# Patient Record
Sex: FEMALE | Employment: OTHER | URBAN - METROPOLITAN AREA
[De-identification: names, ages, dates, MRNs, and addresses within clinical notes are randomized per-mention and may not be internally consistent; named-entity substitution may affect disease eponyms.]

---

## 2017-01-03 RX ORDER — PREGABALIN 50 MG/1
50 CAPSULE ORAL 2 TIMES DAILY
Qty: 180 CAP | Refills: 1 | Status: SHIPPED | OUTPATIENT
Start: 2017-01-03 | End: 2017-07-13 | Stop reason: SDUPTHER

## 2017-01-03 NOTE — TELEPHONE ENCOUNTER
Spoke with patient regarding refill request. Patient stated that she was supposed to have her Lyrica refilled last month but there was an issue and would have cost her $400. Patient stated she asked \"them to hold onto it\" until the new year due to the cost. Patient verified that she needs a refill.

## 2017-01-04 NOTE — TELEPHONE ENCOUNTER
Insurance calling to check and see if the medication, Lyrica has been sent to pt's mail order pharmacy. Call with any questions.  [facility] [employer]: [phone] Fax: [phone]

## 2017-06-01 ENCOUNTER — OFFICE VISIT (OUTPATIENT)
Dept: NEUROLOGY | Age: 82
End: 2017-06-01

## 2017-06-01 VITALS
WEIGHT: 156 LBS | DIASTOLIC BLOOD PRESSURE: 62 MMHG | HEIGHT: 60 IN | OXYGEN SATURATION: 94 % | HEART RATE: 73 BPM | BODY MASS INDEX: 30.63 KG/M2 | SYSTOLIC BLOOD PRESSURE: 116 MMHG | RESPIRATION RATE: 18 BRPM

## 2017-06-01 DIAGNOSIS — G62.9 POLYNEUROPATHY: Primary | ICD-10-CM

## 2017-06-01 NOTE — MR AVS SNAPSHOT
Visit Information Date & Time Provider Department Dept. Phone Encounter #  
 6/1/2017 10:40 AM Wang Corral MD [facility] at [address] [id number] Follow-up Instructions Return in about 6 months (around 12/1/2017). Upcoming Health Maintenance Date Due DTaP/Tdap/Td series (1 - Tdap) 6/10/1955 ZOSTER VACCINE AGE 60> 6/10/1994 GLAUCOMA SCREENING Q2Y 6/10/1999 OSTEOPOROSIS SCREENING (DEXA) 6/10/1999 Pneumococcal 65+ Low/Medium Risk (1 of 2 - PCV13) 6/10/1999 MEDICARE YEARLY EXAM 6/10/1999 INFLUENZA AGE 9 TO ADULT 8/1/2017 Allergies as of 6/1/2017  Review Complete On: 6/1/2017 By: Alfredo Bermudez LPN Severity Noted Reaction Type Reactions Sulfa (Sulfonamide Antibiotics)  12/01/2016    Rash Current Immunizations  Never Reviewed No immunizations on file. Not reviewed this visit You Were Diagnosed With   
  
 Codes Comments Polyneuropathy    -  Primary ICD-10-CM: G62.9 ICD-9-CM: 356.9 Vitals BP Pulse Resp Height(growth percentile) Weight(growth percentile) SpO2  
 116/62 73 18 5' (1.524 m) 156 lb (70.8 kg) 94% BMI Smoking Status 30.47 kg/m2 Never Smoker Vitals History BMI and BSA Data Body Mass Index Body Surface Area  
 30.47 kg/m 2 1.73 m 2 Your Updated Medication List  
  
   
This list is accurate as of: 6/1/17 11:07 AM.  Always use your most recent med list. amLODIPine 5 mg tablet Commonly known as:  Amina Million TAKE 1 TABLET BY MOUTH EVERY DAY  
  
 CALCIUM 600 WITH VITAMIN D3 PO Take  by mouth.  
  
 citalopram 20 mg tablet Commonly known as:  CELEXA  
TAKE ONE TABLET BY MOUTH DAILY IN THE MORNING  
  
 divalproex  mg tablet Commonly known as:  DEPAKOTE Take 125 mg by mouth daily. FIBER PO Take  by mouth. finasteride 5 mg tablet Commonly known as:  PROSCAR  
TAKE 1 TABLET BY MOUTH EVERY DAY  
  
 FISH OIL 1,000 mg Cap Generic drug:  omega-3 fatty acids-vitamin e Take 1 Cap by mouth.  
  
 levothyroxine 125 mcg tablet Commonly known as:  SYNTHROID  
TAKE 1 TABLET BY MOUTH EVERY DAY  
  
 MSM PO Take 1,000 mg by mouth daily. multivitamin tablet Commonly known as:  ONE A DAY Take 1 Tab by mouth daily. pravastatin 40 mg tablet Commonly known as:  PRAVACHOL  
TAKE 1 TABLET BY MOUTH EVERY DAY  
  
 pregabalin 50 mg capsule Commonly known as:  Lujan Dye Take 1 Cap by mouth two (2) times a day. Max Daily Amount: 100 mg.  
  
 valsartan-hydroCHLOROthiazide 320-25 mg per tablet Commonly known as:  DIOVAN-HCT  
TAKE 1 TABLET BY MOUTH EVERY DAY  
  
 VITAMIN C 500 mg tablet Generic drug:  ascorbic acid (vitamin C) Take  by mouth. ZyrTEC 10 mg tablet Generic drug:  cetirizine Take  by mouth daily. Follow-up Instructions Return in about 6 months (around 12/1/2017). Patient Instructions PRESCRIPTION REFILL POLICY [facility] Statement to Patients April 1, 2014 In an effort to ensure the large volume of patient prescription refills is processed in the most efficient and expeditious manner, we are asking our patients to assist us by calling your Pharmacy for all prescription refills, this will include also your  Mail Order Pharmacy. The pharmacy will contact our office electronically to continue the refill process. Please do not wait until the last minute to call your pharmacy. We need at least 48 hours (2days) to fill prescriptions. We also encourage you to call your pharmacy before going to  your prescription to make sure it is ready. With regard to controlled substance prescription refill requests (narcotic refills) that need to be picked up at our office, we ask your cooperation by providing us with at least 72 hours (3days) notice that you will need a refill. We will not refill narcotic prescription refill requests after 4:00pm on any weekday, Monday through Thursday, or after 2:00pm on Fridays, or on the weekends. We encourage everyone to explore another way of getting your prescription refill request processed using [employer], our patient web portal through our electronic medical record system. [employer] is an efficient and effective way to communicate your medication request directly to the office and  downloadable as an ro on your smart phone . [employer] also features a review functionality that allows you to view your medication list as well as leave messages for your physician. Are you ready to get connected? If so please review the attatched instructions or speak to any of our staff to get you set up right away! Thank you so much for your cooperation. Should you have any questions please contact our Practice Administrator. The Physicians and Staff,  [facility] Please bring all medication bottles, including vitamins, supplements and any over-the-counter medications, to your next office visit. Introducing [facility]! [facility] introduces [employer] patient portal. Now you can access parts of your medical record, email your doctor's office, and request medication refills online. 1. In your internet browser, go to [email or web address] 2. Click on the First Time User? Click Here link in the Sign In box. You will see the New Member Sign Up page. 3. Enter your [employer] Access Code exactly as it appears below. You will not need to use this code after youve completed the sign-up process. If you do not sign up before the expiration date, you must request a new code. · [employer] Access Code: [email or web address] Expires: 8/30/2017 10:44 AM 
 
4. Enter the last four digits of your Social Security Number (xxxx) and Date of Birth (mm/dd/yyyy) as indicated and click Submit.  You will be taken to the next sign-up page. 5. Create a MoveEZ ID. This will be your [employer] login ID and cannot be changed, so think of one that is secure and easy to remember. 6. Create a [employer] password. You can change your password at any time. 7. Enter your Password Reset Question and Answer. This can be used at a later time if you forget your password. 8. Enter your e-mail address. You will receive e-mail notification when new information is available in [address] 9. Click Sign Up. You can now view and download portions of your medical record. 10. Click the Download Summary menu link to download a portable copy of your medical information. If you have questions, please visit the Frequently Asked Questions section of the [employer] website. Remember, Move[employer] is NOT to be used for urgent needs. For medical emergencies, dial 911. Now available from your iPhone and Android! Please provide this summary of care documentation to your next provider. Your primary care clinician is listed as Ra Ochoa. If you have any questions after today's visit, please call [phone].

## 2017-06-01 NOTE — PATIENT INSTRUCTIONS
[facility]   Statement to Patients  April 1, 2014      In an effort to ensure the large volume of patient prescription refills is processed in the most efficient and expeditious manner, we are asking our patients to assist us by calling your Pharmacy for all prescription refills, this will include also your  Mail Order Pharmacy. The pharmacy will contact our office electronically to continue the refill process. Please do not wait until the last minute to call your pharmacy. We need at least 48 hours (2days) to fill prescriptions. We also encourage you to call your pharmacy before going to  your prescription to make sure it is ready. With regard to controlled substance prescription refill requests (narcotic refills) that need to be picked up at our office, we ask your cooperation by providing us with at least 72 hours (3days) notice that you will need a refill. We will not refill narcotic prescription refill requests after 4:00pm on any weekday, Monday through Thursday, or after 2:00pm on Fridays, or on the weekends. We encourage everyone to explore another way of getting your prescription refill request processed using [employer], our patient web portal through our electronic medical record system. [employer] is an efficient and effective way to communicate your medication request directly to the office and  downloadable as an ro on your smart phone . [employer] also features a review functionality that allows you to view your medication list as well as leave messages for your physician. Are you ready to get connected? If so please review the attatched instructions or speak to any of our staff to get you set up right away! Thank you so much for your cooperation. Should you have any questions please contact our Practice Administrator.     The Physicians and Staff,  [facility]           Please bring all medication bottles, including vitamins, supplements and any over-the-counter medications, to your next office visit.

## 2017-06-01 NOTE — PROGRESS NOTES
Patient here for follow up on neuropathy in her feet. She feels things have improved since last office visit.

## 2017-06-01 NOTE — PROGRESS NOTES
Neurology Progress Note    HISTORY PROVIDED BY: patient    Chief Complaint:   Chief Complaint   Patient presents with    Neuropathy     in feet      Subjective:   Pt is an 80 y.o. 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 NCS/EMG 2/4/2016 findings could be consistent with an early or mild large fiber neuropathy. Exam revealed prox to distal gradient to PP at ankles, new since previous visit, intact reflexes, no weakness. Mild or early large fiber neuropathy versus small fiber neuropathy, likely idiopathic or hereditary. Continued Lyrica 50mg bid. Discussed BPPV and recommend restarting home exercise. She returns for planned f/u. Still doing very well on Lyrica 50mg bid. No falls. Still still feels a little dizzy, intermittent, will walk to the right when this occurs, has been doing the BPPV exercises.      Past Medical History:   Diagnosis Date    BPPV (benign paroxysmal positional vertigo)     Concussion     after fall 12/14/13    Depression     Environmental allergies     Foot pain, bilateral     Hyperlipidemia     Hypertension     Hypothyroidism     Polyneuropathy       Past Surgical History:   Procedure Laterality Date    HX APPENDECTOMY      HX CATARACT REMOVAL      HX CHOLECYSTECTOMY      HX COLECTOMY      HX TOTAL ABDOMINAL HYSTERECTOMY        Social History     Social History    Marital status:      Spouse name: N/A    Number of children: N/A    Years of education: N/A     Occupational History    Retired      Social History Main Topics    Smoking status: Never Smoker    Smokeless tobacco: Not on file    Alcohol use Yes    Drug use: No    Sexual activity: Not on file     Other Topics Concern    Not on file Social History Narrative     Family History   Problem Relation Age of Onset    Parkinson's Disease Mother     Heart Disease Mother    Glenys Maciel Arthritis-rheumatoid Mother     Cancer Father     Suicide Daughter     No Known Problems Daughter           Objective:   ROS:  Per HPI-  Otherwise 10 point ROS was negative    Allergies   Allergen Reactions    Sulfa (Sulfonamide Antibiotics) Rash       Meds:    Current Outpatient Prescriptions:     PSYLLIUM SEED, WITH DEXTROSE, (FIBER PO), Take  by mouth., Disp: , Rfl:     pregabalin (LYRICA) 50 mg capsule, Take 1 Cap by mouth two (2) times a day. Max Daily Amount: 100 mg., Disp: 180 Cap, Rfl: 1    amLODIPine (NORVASC) 5 mg tablet, TAKE 1 TABLET BY MOUTH EVERY DAY, Disp: , Rfl: 2    citalopram (CELEXA) 20 mg tablet, TAKE ONE TABLET BY MOUTH DAILY IN THE MORNING, Disp: , Rfl: 8    finasteride (PROSCAR) 5 mg tablet, TAKE 1 TABLET BY MOUTH EVERY DAY, Disp: , Rfl: 4    valsartan-hydroCHLOROthiazide (DIOVAN-HCT) 320-25 mg per tablet, TAKE 1 TABLET BY MOUTH EVERY DAY, Disp: , Rfl: 3    pravastatin (PRAVACHOL) 40 mg tablet, TAKE 1 TABLET BY MOUTH EVERY DAY, Disp: , Rfl: 3    levothyroxine (SYNTHROID) 125 mcg tablet, TAKE 1 TABLET BY MOUTH EVERY DAY, Disp: , Rfl: 3    cetirizine (ZYRTEC) 10 mg tablet, Take  by mouth daily. , Disp: , Rfl:     ascorbic acid, vitamin C, (VITAMIN C) 500 mg tablet, Take  by mouth., Disp: , Rfl:     CALCIUM CARBONATE/VITAMIN D3 (CALCIUM 600 WITH VITAMIN D3 PO), Take  by mouth., Disp: , Rfl:     multivitamin (ONE A DAY) tablet, Take 1 Tab by mouth daily. , Disp: , Rfl:     omega-3 fatty acids-vitamin e (FISH OIL) 1,000 mg cap, Take 1 Cap by mouth., Disp: , Rfl:     METHYLSULFONYLMETHANE (MSM PO), Take 1,000 mg by mouth daily. , Disp: , Rfl:     divalproex DR (DEPAKOTE) 125 mg tablet, Take 125 mg by mouth daily. , Disp: , Rfl:     Imaging:  MRI Results (most recent):  No results found for this or any previous visit.    CT Results (most recent):  No results found for this or any previous visit. Reviewed records in [employer] and Jini tab today    Lab Review   No results found for this or any previous visit. Exam:  Visit Vitals    /62    Pulse 73    Resp 18    Ht 5' (1.524 m)    Wt 70.8 kg (156 lb)    SpO2 94%    BMI 30.47 kg/m2     General:  Alert, cooperative, no distress. Head:  Normocephalic, without obvious abnormality, atraumatic. Respiratory:  Heart:   Non labored breathing  Regular rate and rhythm, no murmurs   Neck:   2+ carotids, no bruits   Extremities: Warm, no cyanosis or edema. Pulses: 2+ radial pulses. Neurologic:  MS: Alert and oriented x 4, speech intact. Language intact. Attention and fund of knowledge appropriate. Recent and remote memory intact. Cranial Nerves:  II: visual fields    II: pupils    II: optic disc    III,VII: ptosis none   III,IV,VI: extraocular muscles  EOMI, no nystagmus or diplopia   V: facial light touch sensation     VII: facial muscle function   symmetric   VIII: hearing intact   IX: soft palate elevation     XI: trapezius strength     XI: sternocleidomastoid strength    XII: tongue       Motor: normal bulk and tone, no tremor              Strength: 5/5 throughout  Sensory: Normal to PP throughout today   Coordination:   Gait: normal gait, unable to tandem walk  Reflexes: 2+ symmetric           Assessment/Plan   Pt is an 80 y.o. 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 NCS/EMG 2/4/2016 findings could be consistent with an early or mild large fiber neuropathy. Exam without prox to distal gradient to PP today,  intact reflexes, no weakness, normal gait.   Possible mild or early large fiber neuropathy versus small fiber neuropathy, likely idiopathic or hereditary. Continue Lyrica 50mg bid. Offered referral to vestibular therapy for BPPV, but she declined, sxs are intermittent and not inhibiting activity. F/u in clinic in 6 months,instructed to call if needed. ICD-10-CM ICD-9-CM    1. Polyneuropathy G62.9 356.9        Signed:   Rocky Craven MD  6/1/2017

## 2017-07-13 RX ORDER — PREGABALIN 50 MG/1
50 CAPSULE ORAL 2 TIMES DAILY
Qty: 180 CAP | Refills: 1 | Status: SHIPPED | OUTPATIENT
Start: 2017-07-13 | End: 2017-11-02 | Stop reason: SDUPTHER

## 2017-10-31 ENCOUNTER — TELEPHONE (OUTPATIENT)
Dept: NEUROLOGY | Age: 82
End: 2017-10-31

## 2017-10-31 NOTE — TELEPHONE ENCOUNTER
Pt said refilling her Lyrica was going to be too expensive. She is wondering if there is another medication she can be prescribed. Please call back.  Pt will not be home until 1:00pm tomorrow (11/1)

## 2017-11-02 RX ORDER — PREGABALIN 50 MG/1
50 CAPSULE ORAL 2 TIMES DAILY
Qty: 42 CAP | Refills: 0 | Status: SHIPPED | COMMUNITY
Start: 2017-11-02 | End: 2017-11-21 | Stop reason: SDUPTHER

## 2017-11-02 NOTE — TELEPHONE ENCOUNTER
Paola - Please call pt: See if we can give her samples until our PA coordinator returns next week and can see what the issue is. She is taking Lyrcia 50mg bid.

## 2017-11-02 NOTE — TELEPHONE ENCOUNTER
Spoke with patient and informed her that Dr. Roel Cortse has ok'd samples of Lyrica 50 mg until PA coordinator returns to the office and can assist with this issue. Advised can pick samples up in the office. Patient was given an opportunity to ask questions, repeated information, and verbalized understanding.

## 2017-11-20 RX ORDER — PREGABALIN 50 MG/1
50 CAPSULE ORAL 2 TIMES DAILY
Qty: 42 CAP | Refills: 0 | OUTPATIENT
Start: 2017-11-20

## 2017-11-20 NOTE — TELEPHONE ENCOUNTER
Requested Prescriptions     Pending Prescriptions Disp Refills    pregabalin (LYRICA) 50 mg capsule 42 Cap 0     Sig: Take 1 Cap by mouth two (2) times a day. Max Daily Amount: 100 mg.

## 2017-11-21 RX ORDER — PREGABALIN 50 MG/1
50 CAPSULE ORAL 2 TIMES DAILY
Qty: 60 CAP | Refills: 0 | Status: SHIPPED | OUTPATIENT
Start: 2017-11-21 | End: 2018-01-02

## 2018-01-02 ENCOUNTER — OFFICE VISIT (OUTPATIENT)
Dept: NEUROLOGY | Age: 83
End: 2018-01-02

## 2018-01-02 VITALS
HEART RATE: 73 BPM | OXYGEN SATURATION: 94 % | BODY MASS INDEX: 31.8 KG/M2 | RESPIRATION RATE: 18 BRPM | WEIGHT: 162 LBS | HEIGHT: 60 IN | DIASTOLIC BLOOD PRESSURE: 64 MMHG | SYSTOLIC BLOOD PRESSURE: 114 MMHG

## 2018-01-02 DIAGNOSIS — G62.9 POLYNEUROPATHY: Primary | ICD-10-CM

## 2018-01-02 RX ORDER — GABAPENTIN 300 MG/1
300 CAPSULE ORAL 2 TIMES DAILY
Qty: 60 CAP | Refills: 5 | Status: SHIPPED | OUTPATIENT
Start: 2018-01-02 | End: 2018-01-29 | Stop reason: SDUPTHER

## 2018-01-02 RX ORDER — ROSUVASTATIN CALCIUM 10 MG/1
TABLET, COATED ORAL
Refills: 6 | COMMUNITY
Start: 2017-12-15 | End: 2019-05-01

## 2018-01-02 NOTE — PROGRESS NOTES
Neurology Progress Note    HISTORY PROVIDED BY: patient    Chief Complaint:   Chief Complaint   Patient presents with    Neuropathy     f/u      Subjective:   Pt is an 80 y.o. 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 NCS/EMG 2/4/2016 findings could be consistent with an early or mild large fiber neuropathy. Exam without prox to distal gradient to PP, intact reflexes, no weakness, normal gait. Possible mild or early large fiber neuropathy versus small fiber neuropathy, likely idiopathic or hereditary. Continued Lyrica 50mg bid. Offered referral to vestibular therapy for BPPV, but she declined, sxs are intermittent and not inhibiting activity. F/u in clinic in 6 months,instructed to call if needed. She returns for f/u. She reports leg pain is well controlled on Lyrica 50mg bid, but it is going to cost too much this year. She has been out of Lyrica for a few days and is noticing a difference. She tried gabapentin in past and had dizziness, but this was at the same time she was having BPPV.         Past Medical History:   Diagnosis Date    BPPV (benign paroxysmal positional vertigo)     Concussion     after fall 12/14/13    Depression     Environmental allergies     Foot pain, bilateral     Hyperlipidemia     Hypertension     Hypothyroidism     Polyneuropathy       Past Surgical History:   Procedure Laterality Date    HX APPENDECTOMY      HX CATARACT REMOVAL      HX CHOLECYSTECTOMY      HX COLECTOMY      HX TOTAL ABDOMINAL HYSTERECTOMY        Social History     Social History    Marital status:      Spouse name: N/A    Number of children: N/A    Years of education: N/A     Occupational History    Retired      Social History Main Topics    Smoking status: Never Smoker    Smokeless tobacco: Never Used    Alcohol use Yes    Drug use: No    Sexual activity: Not on file     Other Topics Concern    Not on file     Social History Narrative     Family History   Problem Relation Age of Onset    Parkinson's Disease Mother     Heart Disease Mother    [address] [facility] Arthritis-rheumatoid Mother     Cancer Father     Suicide Daughter     No Known Problems Daughter           Objective:   ROS:  Per HPI o/w neg    Allergies   Allergen Reactions    Sulfa (Sulfonamide Antibiotics) Rash       Meds:    Current Outpatient Prescriptions:     rosuvastatin (CRESTOR) 10 mg tablet, TAKE 1 TABLET BY MOUTH AT BEDTIME (REPLACES PRAVASTATIN), Disp: , Rfl: 6    pregabalin (LYRICA) 50 mg capsule, Take 1 Cap by mouth two (2) times a day. Max Daily Amount: 100 mg., Disp: 60 Cap, Rfl: 0    amLODIPine (NORVASC) 5 mg tablet, TAKE 1 TABLET BY MOUTH EVERY DAY, Disp: , Rfl: 2    citalopram (CELEXA) 20 mg tablet, TAKE ONE TABLET BY MOUTH DAILY IN THE MORNING, Disp: , Rfl: 8    finasteride (PROSCAR) 5 mg tablet, TAKE 1 TABLET BY MOUTH EVERY DAY, Disp: , Rfl: 4    valsartan-hydroCHLOROthiazide (DIOVAN-HCT) 320-25 mg per tablet, TAKE 1 TABLET BY MOUTH EVERY DAY, Disp: , Rfl: 3    levothyroxine (SYNTHROID) 125 mcg tablet, TAKE 1 TABLET BY MOUTH EVERY DAY, Disp: , Rfl: 3    cetirizine (ZYRTEC) 10 mg tablet, Take  by mouth daily. , Disp: , Rfl:     CALCIUM CARBONATE/VITAMIN D3 (CALCIUM 600 WITH VITAMIN D3 PO), Take  by mouth., Disp: , Rfl:     multivitamin (ONE A DAY) tablet, Take 1 Tab by mouth daily. , Disp: , Rfl:     omega-3 fatty acids-vitamin e (FISH OIL) 1,000 mg cap, Take 1 Cap by mouth., Disp: , Rfl:     PSYLLIUM SEED, WITH DEXTROSE, (FIBER PO), Take  by mouth., Disp: , Rfl:     pravastatin (PRAVACHOL) 40 mg tablet, TAKE 1 TABLET BY MOUTH EVERY DAY, Disp: , Rfl: 3    ascorbic acid, vitamin C, (VITAMIN C) 500 mg tablet, Take  by mouth., Disp: , Rfl:    divalproex DR (DEPAKOTE) 125 mg tablet, Take 125 mg by mouth daily. , Disp: , Rfl:     METHYLSULFONYLMETHANE (MSM PO), Take 1,000 mg by mouth daily. , Disp: , Rfl:     Imaging:  MRI Results (most recent):  No results found for this or any previous visit. CT Results (most recent):  No results found for this or any previous visit. Reviewed records in [employer] and [employer] tab today    Lab Review   No results found for this or any previous visit. Exam:  Visit Vitals    /64    Pulse 73    Resp 18    Ht 5' (1.524 m)    Wt 73.5 kg (162 lb)    SpO2 94%    BMI 31.64 kg/m2     General:  Alert, cooperative, no distress. Head:  Normocephalic, without obvious abnormality, atraumatic. Respiratory:  Heart:   Non labored breathing  Regular rate and rhythm, no murmurs   Neck:      Extremities: Warm, no cyanosis or edema. Pulses: 2+ radial pulses. Neurologic:  MS: Alert and oriented x 4, speech intact. Language intact. Attention and fund of knowledge appropriate. Recent and remote memory intact. Cranial Nerves:  II: visual fields    II: pupils    II: optic disc    III,VII: ptosis none   III,IV,VI: extraocular muscles  EOMI, no nystagmus or diplopia   V: facial light touch sensation     VII: facial muscle function   symmetric   VIII: hearing intact   IX: soft palate elevation     XI: trapezius strength     XI: sternocleidomastoid strength    XII: tongue       Motor: normal bulk and tone, no tremor              Strength: 5/5 throughout  Sensory:  Coordination: Intact FTN  Gait: normal gait, able to tandem walk  Reflexes: 2+ symmetric           Assessment/Plan   Pt is an 80 y.o. 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   NCS/EMG 2/4/2016 findings could be consistent with an early or mild large fiber neuropathy. Exam with intact reflexes, no weakness, normal gait. Possible mild or early large fiber neuropathy versus small fiber neuropathy, likely idiopathic or hereditary. Stop Lyrica. Start gabapentin 300mg bid. F/u in clinic in 6 months,instructed to call if needed. ICD-10-CM ICD-9-CM    1. Polyneuropathy G62.9 356.9        Signed:   Janina Galarza MD  1/2/2018

## 2018-01-02 NOTE — PATIENT INSTRUCTIONS
[facility]   Statement to Patients  April 1, 2014      In an effort to ensure the large volume of patient prescription refills is processed in the most efficient and expeditious manner, we are asking our patients to assist us by calling your Pharmacy for all prescription refills, this will include also your  Mail Order Pharmacy. The pharmacy will contact our office electronically to continue the refill process. Please do not wait until the last minute to call your pharmacy. We need at least 48 hours (2days) to fill prescriptions. We also encourage you to call your pharmacy before going to  your prescription to make sure it is ready. With regard to controlled substance prescription refill requests (narcotic refills) that need to be picked up at our office, we ask your cooperation by providing us with at least 72 hours (3days) notice that you will need a refill. We will not refill narcotic prescription refill requests after 4:00pm on any weekday, Monday through Thursday, or after 2:00pm on Fridays, or on the weekends. We encourage everyone to explore another way of getting your prescription refill request processed using [employer], our patient web portal through our electronic medical record system. [employer] is an efficient and effective way to communicate your medication request directly to the office and  downloadable as an ro on your smart phone . [employer] also features a review functionality that allows you to view your medication list as well as leave messages for your physician. Are you ready to get connected? If so please review the attatched instructions or speak to any of our staff to get you set up right away! Thank you so much for your cooperation. Should you have any questions please contact our Practice Administrator.     The Physicians and Staff,  [facility]           Please bring all medication bottles, including vitamins, supplements and any over-the-counter medications, to your next office visit.

## 2018-01-02 NOTE — PROGRESS NOTES
Patient here for follow up on neuropathy. She reported that Lyrica is getting too expensive although it is helping and would like to discuss different options of medications.

## 2018-01-02 NOTE — MR AVS SNAPSHOT
Visit Information Date & Time Provider Department Dept. Phone Encounter #  
 1/2/2018  1:40 PM Eliel Adan MD [facility] at [facility] [phone] Follow-up Instructions Return in about 6 months (around 7/2/2018). Upcoming Health Maintenance Date Due DTaP/Tdap/Td series (1 - Tdap) 6[birth year]10/1955 ZOSTER VACCINE AGE 60> 4/10/1994 GLAUCOMA SCREENING Q2Y 6/10/1999 OSTEOPOROSIS SCREENING (DEXA) 6/10/1999 Pneumococcal 65+ Low/Medium Risk (1 of 2 - PCV13) 6/10/1999 MEDICARE YEARLY EXAM 6/10/1999 Influenza Age 5 to Adult 8/1/2017 Allergies as of 1/2/2018  Review Complete On: 1/2/2018 By: Eliel Adan MD  
  
 Severity Noted Reaction Type Reactions Sulfa (Sulfonamide Antibiotics)  12/01/2016    Rash Current Immunizations  Never Reviewed No immunizations on file. Not reviewed this visit You Were Diagnosed With   
  
 Codes Comments Polyneuropathy    -  Primary ICD-10-CM: G62.9 ICD-9-CM: 356.9 Vitals BP Pulse Resp Height(growth percentile) Weight(growth percentile) SpO2  
 114/64 73 18 5' (1.524 m) 162 lb (73.5 kg) 94% BMI Smoking Status 31.64 kg/m2 Never Smoker Vitals History BMI and BSA Data Body Mass Index Body Surface Area  
 31.64 kg/m 2 1.76 m 2 Preferred Pharmacy Pharmacy Name Phone [id number], [address] [place] [address] [phone] Your Updated Medication List  
  
   
This list is accurate as of: 1/2/18  2:01 PM.  Always use your most recent med list. amLODIPine 5 mg tablet Commonly known as:  Xuan Hair TAKE 1 TABLET BY MOUTH EVERY DAY  
  
 CALCIUM 600 WITH VITAMIN D3 PO Take  by mouth.  
  
 citalopram 20 mg tablet Commonly known as:  CELEXA  
TAKE ONE TABLET BY MOUTH DAILY IN THE MORNING  
  
 divalproex  mg tablet Commonly known as:  DEPAKOTE  
 Take 125 mg by mouth daily. FIBER PO Take  by mouth. finasteride 5 mg tablet Commonly known as:  PROSCAR  
TAKE 1 TABLET BY MOUTH EVERY DAY  
  
 FISH OIL 1,000 mg Cap Generic drug:  omega-3 fatty acids-vitamin e Take 1 Cap by mouth.  
  
 gabapentin 300 mg capsule Commonly known as:  NEURONTIN Take 1 Cap by mouth two (2) times a day. levothyroxine 125 mcg tablet Commonly known as:  SYNTHROID  
TAKE 1 TABLET BY MOUTH EVERY DAY  
  
 MSM PO Take 1,000 mg by mouth daily. multivitamin tablet Commonly known as:  ONE A DAY Take 1 Tab by mouth daily. pravastatin 40 mg tablet Commonly known as:  PRAVACHOL  
TAKE 1 TABLET BY MOUTH EVERY DAY  
  
 rosuvastatin 10 mg tablet Commonly known as:  CRESTOR  
TAKE 1 TABLET BY MOUTH AT BEDTIME (REPLACES PRAVASTATIN)  
  
 valsartan-hydroCHLOROthiazide 320-25 mg per tablet Commonly known as:  DIOVAN-HCT  
TAKE 1 TABLET BY MOUTH EVERY DAY  
  
 VITAMIN C 500 mg tablet Generic drug:  ascorbic acid (vitamin C) Take  by mouth. ZyrTEC 10 mg tablet Generic drug:  cetirizine Take  by mouth daily. Prescriptions Sent to Pharmacy Refills  
 gabapentin (NEURONTIN) 300 mg capsule 5 Sig: Take 1 Cap by mouth two (2) times a day. Class: Normal  
 Pharmacy: [id number] [place] [address] #: [phone] Route: Oral  
  
Follow-up Instructions Return in about 6 months (around 7/2/2018). Patient Instructions PRESCRIPTION REFILL POLICY [facility] Statement to Patients April 1, 2014 In an effort to ensure the large volume of patient prescription refills is processed in the most efficient and expeditious manner, we are asking our patients to assist us by calling your Pharmacy for all prescription refills, this will include also your  Mail Order Pharmacy.  The pharmacy will contact our office electronically to continue the refill process. Please do not wait until the last minute to call your pharmacy. We need at least 48 hours (2days) to fill prescriptions. We also encourage you to call your pharmacy before going to  your prescription to make sure it is ready. With regard to controlled substance prescription refill requests (narcotic refills) that need to be picked up at our office, we ask your cooperation by providing us with at least 72 hours (3days) notice that you will need a refill. We will not refill narcotic prescription refill requests after 4:00pm on any weekday, Monday through Thursday, or after 2:00pm on Fridays, or on the weekends. We encourage everyone to explore another way of getting your prescription refill request processed using [email or web address], our patient web portal through our electronic medical record system. [email or web address] is an efficient and effective way to communicate your medication request directly to the office and  downloadable as an ro on your smart phone . [email or web address] also features a review functionality that allows you to view your medication list as well as leave messages for your physician. Are you ready to get connected? If so please review the attatched instructions or speak to any of our staff to get you set up right away! Thank you so much for your cooperation. Should you have any questions please contact our Practice Administrator. The Physicians and Staff,  [facility] Please bring all medication bottles, including vitamins, supplements and any over-the-counter medications, to your next office visit. Introducing [facility]! [facility] introduces [email or web address] patient portal. Now you can access parts of your medical record, email your doctor's office, and request medication refills online. 1. In your internet browser, go to [email or web address] 2. Click on the First Time User? Click Here link in the Sign In box. You will see the New Member Sign Up page. 3. Enter your [employer] Access Code exactly as it appears below. You will not need to use this code after youve completed the sign-up process. If you do not sign up before the expiration date, you must request a new code. · [employer] Access Code: [email or web address] Expires: 4/2/2018 10:44 AM 
 
4. Enter the last four digits of your Social Security Number (xxxx) and Date of Birth (mm/dd/yyyy) as indicated and click Submit. You will be taken to the next sign-up page. 5. Create a [employer] ID. This will be your [employer] login ID and cannot be changed, so think of one that is secure and easy to remember. 6. Create a [employer] password. You can change your password at any time. 7. Enter your Password Reset Question and Answer. This can be used at a later time if you forget your password. 8. Enter your e-mail address. You will receive e-mail notification when new information is available in [address] 9. Click Sign Up. You can now view and download portions of your medical record. 10. Click the Download Summary menu link to download a portable copy of your medical information. If you have questions, please visit the Frequently Asked Questions section of the [employer] website. Remember, [employer] is NOT to be used for urgent needs. For medical emergencies, dial 911. Now available from your iPhone and Android! Please provide this summary of care documentation to your next provider. Your primary care clinician is listed as Gayle Payment. If you have any questions after today's visit, please call [phone].

## 2018-01-29 ENCOUNTER — TELEPHONE (OUTPATIENT)
Dept: NEUROLOGY | Age: 83
End: 2018-01-29

## 2018-01-29 RX ORDER — GABAPENTIN 300 MG/1
CAPSULE ORAL
Qty: 90 CAP | Refills: 5 | Status: SHIPPED | OUTPATIENT
Start: 2018-01-29 | End: 2018-07-18 | Stop reason: SDUPTHER

## 2018-01-29 NOTE — TELEPHONE ENCOUNTER
Pt called and needs her Gabapentin 300m mg to be stronger because she still wakes up with pain she will be home till 9:30am. Please call back

## 2018-01-29 NOTE — TELEPHONE ENCOUNTER
Paola - Please call pt: No problem, have her increase the gabapentin to 300mg in AM and 600mg in PM.   New Rx sent to pharmacy.

## 2018-01-29 NOTE — TELEPHONE ENCOUNTER
Spoke with patient. She stated she takes gabapentin 300 mg, takes 1 cap two times a day. She stated it is helping but she wakes up in the middle of the night several nights a week with her legs hurting. She stated she feels her gabapentin needs to be increased \"just a little bit\". She stated if she is not home, ok to leave a detailed message. She would like her prescription sent to [facility] on [address]. Advised will discuss this with Dr. Mitch Cummings and will call patient back with her recommendations.

## 2018-01-30 NOTE — TELEPHONE ENCOUNTER
Spoke with patient. Informed her that Dr. Howell First increased her gabapentin to 300 mg in AM and 600 mg in PM. [facility]. Patient was given an opportunity to ask questions, repeated information, and verbalized understanding.

## 2018-08-06 ENCOUNTER — OFFICE VISIT (OUTPATIENT)
Dept: NEUROLOGY | Age: 83
End: 2018-08-06

## 2018-08-06 VITALS
DIASTOLIC BLOOD PRESSURE: 82 MMHG | OXYGEN SATURATION: 93 % | HEIGHT: 60 IN | BODY MASS INDEX: 30.82 KG/M2 | RESPIRATION RATE: 20 BRPM | HEART RATE: 84 BPM | WEIGHT: 157 LBS | SYSTOLIC BLOOD PRESSURE: 122 MMHG

## 2018-08-06 DIAGNOSIS — G62.9 POLYNEUROPATHY: Primary | ICD-10-CM

## 2018-08-06 RX ORDER — GABAPENTIN 300 MG/1
CAPSULE ORAL
Qty: 120 CAP | Refills: 5 | Status: SHIPPED | OUTPATIENT
Start: 2018-08-06 | End: 2019-01-27 | Stop reason: SDUPTHER

## 2018-08-06 RX ORDER — ATORVASTATIN CALCIUM 40 MG/1
20 TABLET, FILM COATED ORAL DAILY
COMMUNITY

## 2018-08-06 NOTE — PROGRESS NOTES
Neurology Progress Note    HISTORY PROVIDED BY: patient    Chief Complaint:   Chief Complaint   Patient presents with    Neuropathy      Subjective:   Pt is an 80 y.o. 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  NCS/EMG 2/4/2016 findings could be consistent with an early or mild large fiber neuropathy. Exam with intact reflexes, no weakness, normal gait. Possible mild or early large fiber neuropathy versus small fiber neuropathy, likely idiopathic or hereditary. Stopped Lyrica. Started gabapentin 300mg bid. She returns for f/u. Since last visit, we increased dose to 300/600mg. She c/o her feet being cold at night in bed and it takes a while to warm them. Pain is relatively well controlled, still has some pain. No new complaints.      Past Medical History:   Diagnosis Date    BPPV (benign paroxysmal positional vertigo)     Concussion     after fall 12/14/13    Depression     Environmental allergies     Foot pain, bilateral     Hyperlipidemia     Hypertension     Hypothyroidism     Polyneuropathy       Past Surgical History:   Procedure Laterality Date    HX APPENDECTOMY      HX CATARACT REMOVAL      HX CHOLECYSTECTOMY      HX COLECTOMY      HX TOTAL ABDOMINAL HYSTERECTOMY        Social History     Social History    Marital status:      Spouse name: N/A    Number of children: N/A    Years of education: N/A     Occupational History    Retired      Social History Main Topics    Smoking status: Never Smoker    Smokeless tobacco: Never Used    Alcohol use Yes    Drug use: No    Sexual activity: Not on file     Other Topics Concern    Not on file     Social History Narrative     Family History   Problem Relation Age of Onset    Parkinson's Disease Mother     Heart Disease Mother    [facility] Arthritis-rheumatoid Mother     Cancer Father     Suicide Daughter     No Known Problems Daughter           Objective:   ROS:  Per HPI o/w neg    Allergies   Allergen Reactions    Sulfa (Sulfonamide Antibiotics) Rash       Meds:    Current Outpatient Prescriptions:     atorvastatin (LIPITOR) 40 mg tablet, Take  by mouth daily. , Disp: , Rfl:     gabapentin (NEURONTIN) 300 mg capsule, TAKE ONE CAPSULE BY MOUTH DAILY IN THE MORNING AND 2 CAPSULES IN THE EVENING, Disp: 90 Cap, Rfl: 1    rosuvastatin (CRESTOR) 10 mg tablet, TAKE 1 TABLET BY MOUTH AT BEDTIME (REPLACES PRAVASTATIN), Disp: , Rfl: 6    PSYLLIUM SEED, WITH DEXTROSE, (FIBER PO), Take  by mouth., Disp: , Rfl:     amLODIPine (NORVASC) 5 mg tablet, TAKE 1 TABLET BY MOUTH EVERY DAY, Disp: , Rfl: 2    citalopram (CELEXA) 20 mg tablet, TAKE ONE TABLET BY MOUTH DAILY IN THE MORNING, Disp: , Rfl: 8    finasteride (PROSCAR) 5 mg tablet, TAKE 1 TABLET BY MOUTH EVERY DAY, Disp: , Rfl: 4    valsartan-hydroCHLOROthiazide (DIOVAN-HCT) 320-25 mg per tablet, TAKE 1 TABLET BY MOUTH EVERY DAY, Disp: , Rfl: 3    levothyroxine (SYNTHROID) 125 mcg tablet, 150 mcg. TAKE 1 TABLET BY MOUTH EVERY DAY, Disp: , Rfl: 3    cetirizine (ZYRTEC) 10 mg tablet, Take  by mouth daily. , Disp: , Rfl:     ascorbic acid, vitamin C, (VITAMIN C) 500 mg tablet, Take  by mouth., Disp: , Rfl:     CALCIUM CARBONATE/VITAMIN D3 (CALCIUM 600 WITH VITAMIN D3 PO), Take  by mouth., Disp: , Rfl:     multivitamin (ONE A DAY) tablet, Take 1 Tab by mouth daily. , Disp: , Rfl:     divalproex DR (DEPAKOTE) 125 mg tablet, Take 125 mg by mouth daily. , Disp: , Rfl:     omega-3 fatty acids-vitamin e (FISH OIL) 1,000 mg cap, Take 1 Cap by mouth., Disp: , Rfl:     METHYLSULFONYLMETHANE (MSM PO), Take 1,000 mg by mouth daily. , Disp: , Rfl:     pravastatin (PRAVACHOL) 40 mg tablet, TAKE 1 TABLET BY MOUTH EVERY DAY, Disp: , Rfl: 3    Imaging:  MRI Results (most recent):  No results found for this or any previous visit. CT Results (most recent):  No results found for this or any previous visit. Reviewed records in [facility] and [facility] tab today    Lab Review   No results found for this or any previous visit. Exam:  Visit Vitals    /82    Pulse 84    Resp 20    Ht 5' (1.524 m)    Wt 71.2 kg (157 lb)    SpO2 93%    BMI 30.66 kg/m2     General:  Alert, cooperative, no distress. Head:  Normocephalic, without obvious abnormality, atraumatic. Respiratory:  Heart:   Non labored breathing  Regular rate and rhythm, no murmurs   Neck:      Extremities: Warm, no cyanosis or edema. Pulses: 2+ radial pulses. Neurologic:  MS: Alert and oriented x 4, speech intact. Language intact. Attention and fund of knowledge appropriate. Recent and remote memory intact. Cranial Nerves:  II: visual fields    II: pupils    II: optic disc    III,VII: ptosis none   III,IV,VI: extraocular muscles  EOMI, no nystagmus or diplopia   V: facial light touch sensation     VII: facial muscle function   symmetric   VIII: hearing intact   IX: soft palate elevation     XI: trapezius strength     XI: sternocleidomastoid strength    XII: tongue       Motor: normal bulk and tone, no tremor              Strength: 5/5 throughout  Sensory:  Coordination: Intact FTN  Gait: normal gait, able to tandem walk  Reflexes: 2+ symmetric           Assessment/Plan   Pt is an 80 y.o. 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 NCS/EMG 2/4/2016 findings could be consistent with an early or mild large fiber neuropathy.   Exam with intact reflexes, no weakness, normal gait, no signs of significant progression. Possible mild or early large fiber neuropathy versus small fiber neuropathy, likely idiopathic or hereditary. Will increase gabapentin to 300mg/900mg. F/u in clinic in 6 months,instructed to call if needed. ICD-10-CM ICD-9-CM    1. Polyneuropathy G62.9 356.9        Signed:   Avery Baez MD  8/6/2018

## 2018-08-06 NOTE — PATIENT INSTRUCTIONS

## 2018-08-06 NOTE — MR AVS SNAPSHOT
[place] [address] [address] 
[phone] Patient: Rafiq Ware MRN: [id number] Visit Information Date & Time Provider Department Dept. Phone Encounter #  
 8/6/2018  2:00 PM MD [facility] [place] [address] Follow-up Instructions Return in about 6 months (around 2/6/2019). Upcoming Health Maintenance Date Due DTaP/Tdap/Td series (1 - Tdap) 6/10/1955 ZOSTER VACCINE AGE 60> 4/10/1994 GLAUCOMA SCREENING Q2Y 6/10/1999 Bone Densitometry (Dexa) Screening 6/10/1999 Pneumococcal 65+ Low/Medium Risk (1 of 2 - PCV13) 6/10/1999 MEDICARE YEARLY EXAM 3/14/2018 Influenza Age 5 to Adult 8/1/2018 Allergies as of 8/6/2018  Review Complete On: 8/6/2018 By: Chema Laura LPN Severity Noted Reaction Type Reactions Sulfa (Sulfonamide Antibiotics)  12/01/2016    Rash Current Immunizations  Never Reviewed No immunizations on file. Not reviewed this visit You Were Diagnosed With   
  
 Codes Comments Polyneuropathy    -  Primary ICD-10-CM: G62.9 ICD-9-CM: 356.9 Vitals BP Pulse Resp Height(growth percentile) Weight(growth percentile) SpO2  
 122/82 84 20 5' (1.524 m) 157 lb (71.2 kg) 93% BMI Smoking Status 30.66 kg/m2 Never Smoker Vitals History BMI and BSA Data Body Mass Index Body Surface Area  
 30.66 kg/m 2 1.74 m 2 Preferred Pharmacy Pharmacy Name Phone [facility]/PHARMACY #[id number] [place] [address] [phone] Your Updated Medication List  
  
   
This list is accurate as of 8/6/18  2:36 PM.  Always use your most recent med list. amLODIPine 5 mg tablet Commonly known as:  Morris Lisa TAKE 1 TABLET BY MOUTH EVERY DAY  
  
 atorvastatin 40 mg tablet Commonly known as:  LIPITOR Take  by mouth daily. CALCIUM 600 WITH VITAMIN D3 PO Take  by mouth.  
  
 citalopram 20 mg tablet Commonly known as:  CELEXA  
TAKE ONE TABLET BY MOUTH DAILY IN THE MORNING  
  
 divalproex  mg tablet Commonly known as:  DEPAKOTE Take 125 mg by mouth daily. FIBER PO Take  by mouth. finasteride 5 mg tablet Commonly known as:  PROSCAR  
TAKE 1 TABLET BY MOUTH EVERY DAY  
  
 FISH OIL 1,000 mg Cap Generic drug:  omega-3 fatty acids-vitamin e Take 1 Cap by mouth.  
  
 gabapentin 300 mg capsule Commonly known as:  NEURONTIN  
TAKE ONE CAPSULE BY MOUTH DAILY IN THE MORNING AND 3 CAPSULES IN THE EVENING  
  
 levothyroxine 125 mcg tablet Commonly known as:  SYNTHROID  
150 mcg. TAKE 1 TABLET BY MOUTH EVERY DAY  
  
 MSM PO Take 1,000 mg by mouth daily. multivitamin tablet Commonly known as:  ONE A DAY Take 1 Tab by mouth daily. pravastatin 40 mg tablet Commonly known as:  PRAVACHOL  
TAKE 1 TABLET BY MOUTH EVERY DAY  
  
 rosuvastatin 10 mg tablet Commonly known as:  CRESTOR  
TAKE 1 TABLET BY MOUTH AT BEDTIME (REPLACES PRAVASTATIN)  
  
 valsartan-hydroCHLOROthiazide 320-25 mg per tablet Commonly known as:  DIOVAN-HCT  
TAKE 1 TABLET BY MOUTH EVERY DAY  
  
 VITAMIN C 500 mg tablet Generic drug:  ascorbic acid (vitamin C) Take  by mouth. ZyrTEC 10 mg tablet Generic drug:  cetirizine Take  by mouth daily. Prescriptions Sent to Pharmacy Refills  
 gabapentin (NEURONTIN) 300 mg capsule 5 Sig: TAKE ONE CAPSULE BY MOUTH DAILY IN THE MORNING AND 3 CAPSULES IN THE EVENING Class: Normal  
 Pharmacy: [facility]/pharmacy #[id number] #: [phone] Follow-up Instructions Return in about 6 months (around 2/6/2019). Patient Instructions A Healthy Lifestyle: Care Instructions Your Care Instructions A healthy lifestyle can help you feel good, stay at a healthy weight, and have plenty of energy for both work and play. A healthy lifestyle is something you can share with your whole family. A healthy lifestyle also can lower your risk for serious health problems, such as high blood pressure, heart disease, and diabetes. You can follow a few steps listed below to improve your health and the health of your family. Follow-up care is a key part of your treatment and safety. Be sure to make and go to all appointments, and call your doctor if you are having problems. It's also a good idea to know your test results and keep a list of the medicines you take. How can you care for yourself at home? · Do not eat too much sugar, fat, or fast foods. You can still have dessert and treats now and then. The goal is moderation. · Start small to improve your eating habits. Pay attention to portion sizes, drink less juice and soda pop, and eat more fruits and vegetables. ¨ Eat a healthy amount of food. A 3-ounce serving of meat, for example, is about the size of a deck of cards. Fill the rest of your plate with vegetables and whole grains. ¨ Limit the amount of soda and sports drinks you have every day. Drink more water when you are thirsty. ¨ Eat at least 5 servings of fruits and vegetables every day. It may seem like a lot, but it is not hard to reach this goal. A serving or helping is 1 piece of fruit, 1 cup of vegetables, or 2 cups of leafy, raw vegetables. Have an apple or some carrot sticks as an afternoon snack instead of a candy bar. Try to have fruits and/or vegetables at every meal. 
· Make exercise part of your daily routine. You may want to start with simple activities, such as walking, bicycling, or slow swimming. Try to be active 30 to 60 minutes every day. You do not need to do all 30 to 60 minutes all at once.  For example, you can exercise 3 times a day for 10 or 20 minutes. Moderate exercise is safe for most people, but it is always a good idea to talk to your doctor before starting an exercise program. 
· Keep moving. Henrik Northern the lawn, work in the garden, or buySAFE. Take the stairs instead of the elevator at work. · If you smoke, quit. People who smoke have an increased risk for heart attack, stroke, cancer, and other lung illnesses. Quitting is hard, but there are ways to boost your chance of quitting tobacco for good. ¨ Use nicotine gum, patches, or lozenges. ¨ Ask your doctor about stop-smoking programs and medicines. ¨ Keep trying. In addition to reducing your risk of diseases in the future, you will notice some benefits soon after you stop using tobacco. If you have shortness of breath or asthma symptoms, they will likely get better within a few weeks after you quit. · Limit how much alcohol you drink. Moderate amounts of alcohol (up to 2 drinks a day for men, 1 drink a day for women) are okay. But drinking too much can lead to liver problems, high blood pressure, and other health problems. Family health If you have a family, there are many things you can do together to improve your health. · Eat meals together as a family as often as possible. · Eat healthy foods. This includes fruits, vegetables, lean meats and dairy, and whole grains. · Include your family in your fitness plan. Most people think of activities such as jogging or tennis as the way to fitness, but there are many ways you and your family can be more active. Anything that makes you breathe hard and gets your heart pumping is exercise. Here are some tips: 
¨ Walk to do errands or to take your child to school or the bus. ¨ Go for a family bike ride after dinner instead of watching TV. Where can you learn more? Go to http://shobha-harjinder.info/. Enter J724 in the search box to learn more about \"A Healthy Lifestyle: Care Instructions. \" Current as of: December 7, 2017 Content Version: 11.7 © 3475-1943 [employer]. Care instructions adapted under license by [employer] (which disclaims liability or warranty for this information). If you have questions about a medical condition or this instruction, always ask your healthcare professional. [address] any warranty or liability for your use of this information. Introducing [facility]! [employer] introduces Kitchensurfing patient portal. Now you can access parts of your medical record, email your doctor's office, and request medication refills online. 1. In your internet browser, go to [email or web address] 2. Click on the First Time User? Click Here link in the Sign In box. You will see the New Member Sign Up page. 3. Enter your Kitchensurfing Access Code exactly as it appears below. You will not need to use this code after youve completed the sign-up process. If you do not sign up before the expiration date, you must request a new code. · Kitchensurfing Access Code: [email or web address] Expires: 11/4/2018  1:50 PM 
 
4. Enter the last four digits of your Social Security Number (xxxx) and Date of Birth (mm/dd/yyyy) as indicated and click Submit. You will be taken to the next sign-up page. 5. Create a Kitchensurfing ID. This will be your Kitchensurfing login ID and cannot be changed, so think of one that is secure and easy to remember. 6. Create a Kitchensurfing password. You can change your password at any time. 7. Enter your Password Reset Question and Answer. This can be used at a later time if you forget your password. 8. Enter your e-mail address. You will receive e-mail notification when new information is available in [address] 9. Click Sign Up. You can now view and download portions of your medical record. 10. Click the Download Summary menu link to download a portable copy of your medical information. If you have questions, please visit the Frequently Asked Questions section of the [employer] website. Remember, Zafin is NOT to be used for urgent needs. For medical emergencies, dial 911. Now available from your iPhone and Android! Please provide this summary of care documentation to your next provider. Your primary care clinician is listed as Charan Bashir. If you have any questions after today's visit, please call [phone].

## 2019-02-07 ENCOUNTER — TELEPHONE (OUTPATIENT)
Dept: NEUROLOGY | Age: 84
End: 2019-02-07

## 2019-02-07 NOTE — TELEPHONE ENCOUNTER
Re: gabapentin    Per document scanned into Media:  \"Reason for notification: This drug is on our formulary and is subject to a quantity limit (QL). We will not continue to provide more than what our QL permits, which is 90 Capsules every 30 days. \"    PA is needed for quantity of 120 capsules per 30 days. PA submitted via CMM to [facility]. Pending status: \"Kindra Sen (Key: [id number])   Need help? Call us at [phone]   Status   Sent to Plan today   Next Steps   The plan will fax you a determination, typically within 1 to 5 business days. \"      Will update when determination is made.

## 2019-02-07 NOTE — TELEPHONE ENCOUNTER
Per our conversation -- patient is concerned about Gabapentin Cap 300mg. I have scanned the insurance document she provided under 'Patient Administration - Patient Documents - [employer] / Gabapentin Cap - 02/07/19 Ross Arthur'    Please advise.  Patient is awaiting call back: Reji # [phone]

## 2019-02-08 ENCOUNTER — TELEPHONE (OUTPATIENT)
Dept: NEUROLOGY | Age: 84
End: 2019-02-08

## 2019-02-08 NOTE — TELEPHONE ENCOUNTER
Re: gabapentin    Approval received from [facility]. Auth # not provided. Auth good 1/1/19 - 12/31/19. Quantity approved: 120 caps per 30 days. Faxed approval to pt's [facility] pharmacy.

## 2019-02-22 ENCOUNTER — TELEPHONE (OUTPATIENT)
Dept: NEUROLOGY | Age: 84
End: 2019-02-22

## 2019-02-22 NOTE — TELEPHONE ENCOUNTER
Message from [address] [place] [address]. \"Pls cl re: letter from [facility] and refill for Gabapentin. Stated she needs 100mg capsule for a 90 day supply. \"

## 2019-02-22 NOTE — TELEPHONE ENCOUNTER
Patient stated she got an approval letter from [employer] for her Gabapentin. She also reports that she has been taking Gabapentin 300mg QAM and 300mg QPM. Per last visit, she is supposed to be on 900mg QPM. She stated she doesn't need to be on that much at night and is going to continue pn what she has been taking. She stated she has a supply on hand and doesn't need as refill at this time. I advised patient to bring her Gabapentin bottles to her upcoming follow up appointment.

## 2019-04-22 RX ORDER — GABAPENTIN 300 MG/1
CAPSULE ORAL
Qty: 120 CAP | Refills: 0 | Status: SHIPPED | OUTPATIENT
Start: 2019-04-22 | End: 2019-05-01

## 2019-04-22 NOTE — TELEPHONE ENCOUNTER
Requested Prescriptions     Pending Prescriptions Disp Refills    gabapentin (NEURONTIN) 300 mg capsule 120 Cap 0     Sig: TAKE 1 CAPSULE BY MOUTH DAILY IN THE MORNING AND 3 CAPSULES IN THE EVENING     Pt is scheduled for a f/u on 5/1.

## 2019-05-01 ENCOUNTER — OFFICE VISIT (OUTPATIENT)
Dept: NEUROLOGY | Age: 84
End: 2019-05-01

## 2019-05-01 VITALS
SYSTOLIC BLOOD PRESSURE: 124 MMHG | RESPIRATION RATE: 18 BRPM | HEART RATE: 82 BPM | WEIGHT: 158 LBS | BODY MASS INDEX: 30.86 KG/M2 | OXYGEN SATURATION: 98 % | DIASTOLIC BLOOD PRESSURE: 66 MMHG

## 2019-05-01 DIAGNOSIS — G62.9 POLYNEUROPATHY: Primary | ICD-10-CM

## 2019-05-01 RX ORDER — GABAPENTIN 300 MG/1
CAPSULE ORAL
Qty: 360 CAP | Refills: 3 | Status: SHIPPED | OUTPATIENT
Start: 2019-05-01 | End: 2019-11-10 | Stop reason: SDUPTHER

## 2019-05-01 NOTE — LETTER
5/1/19 Patient: Rafiq Ware YOB: 1934 Date of Visit: 5/1/2019 Mroris Mcmillan MD 
[address] [place] [phone] VIA Facsimile: [phone] Dear Morris Mcmillan MD, Thank you for referring Ms. Rafiq Ware to [address] [place] [address] for evaluation. My notes for this consultation are attached. If you have questions, please do not hesitate to call me. I look forward to following your patient along with you. Sincerely, Klarissa Coronado MD

## 2019-05-01 NOTE — PROGRESS NOTES
Neurology Progress Note HISTORY PROVIDED BY: patient Chief Complaint:  
Chief Complaint Patient presents with  Neurologic Problem Subjective:  
Pt is an 80 y.o. right handed female last seen in clinic on 8/6/18 in f/u for PN. 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 NCS/EMG 2/4/2016 findings could be consistent with an early or mild large fiber neuropathy. Exam with intact reflexes, no weakness, normal gait, no signs of significant progression. Possible mild or early large fiber neuropathy versus small fiber neuropathy, likely idiopathic or hereditary. Increased gabapentin to 300mg/900mg. She returns for f/u. She is please with current level of pain control. She can wear shoes that she previously had trouble wearing. She is tolerating current dose of gabapentin. There was some confusion about 100mg vs 300mg tabs, but I have been prescribing 300mg tabs since Jan, 2018. She has no new complaints. Mentions her 85th birthday is approaching and if she lives past this birthday, she will have lived to an older age than any other family members. Past Medical History:  
Diagnosis Date  BPPV (benign paroxysmal positional vertigo)  Concussion   
 after fall 12/14/13  Depression  Environmental allergies  Foot pain, bilateral   
 Hyperlipidemia  Hypertension  Hypothyroidism  Polyneuropathy Past Surgical History:  
Procedure Laterality Date  HX APPENDECTOMY  HX CATARACT REMOVAL    
 HX CHOLECYSTECTOMY  HX COLECTOMY  HX TOTAL ABDOMINAL HYSTERECTOMY Social History Socioeconomic History  Marital status:  Spouse name: Not on file  Number of children: Not on file  Years of education: Not on file  Highest education level: Not on file Occupational History  Occupation: Retired Social Needs  Financial resource strain: Not on file  Food insecurity:  
  Worry: Not on file Inability: Not on file  Transportation needs:  
  Medical: Not on file Non-medical: Not on file Tobacco Use  Smoking status: Never Smoker  Smokeless tobacco: Never Used Substance and Sexual Activity  Alcohol use: Yes  Drug use: No  
 Sexual activity: Not on file Lifestyle  Physical activity:  
  Days per week: Not on file Minutes per session: Not on file  Stress: Not on file Relationships  Social connections:  
  Talks on phone: Not on file Gets together: Not on file Attends Mosque service: Not on file Active member of club or organization: Not on file Attends meetings of clubs or organizations: Not on file Relationship status: Not on file  Intimate partner violence:  
  Fear of current or ex partner: Not on file Emotionally abused: Not on file Physically abused: Not on file Forced sexual activity: Not on file Other Topics Concern  Not on file Social History Narrative  Not on file Family History Problem Relation Age of Onset  Parkinson's Disease Mother  Heart Disease Mother  Arthritis-rheumatoid Mother  Cancer Father  Suicide Daughter  No Known Problems Daughter Objective:  
ROS: 
Per HPI o/w reviewed and neg Allergies Allergen Reactions  Sulfa (Sulfonamide Antibiotics) Rash Meds: 
 
Current Outpatient Medications:  
  gabapentin (NEURONTIN) 300 mg capsule, TAKE 1 CAPSULE BY MOUTH DAILY IN THE MORNING AND 3 CAPSULES IN THE EVENING, Disp: 120 Cap, Rfl: 0 
  atorvastatin (LIPITOR) 40 mg tablet, Take  by mouth daily. , Disp: , Rfl:  
   amLODIPine (NORVASC) 5 mg tablet, TAKE 1 TABLET BY MOUTH EVERY DAY, Disp: , Rfl: 2   citalopram (CELEXA) 20 mg tablet, TAKE ONE TABLET BY MOUTH DAILY IN THE MORNING, Disp: , Rfl: 8 
  finasteride (PROSCAR) 5 mg tablet, TAKE 1 TABLET BY MOUTH EVERY DAY, Disp: , Rfl: 4 
  valsartan-hydroCHLOROthiazide (DIOVAN-HCT) 320-25 mg per tablet, TAKE 1 TABLET BY MOUTH EVERY DAY, Disp: , Rfl: 3 
  levothyroxine (SYNTHROID) 125 mcg tablet, 150 mcg. TAKE 1 TABLET BY MOUTH EVERY DAY, Disp: , Rfl: 3 
  cetirizine (ZYRTEC) 10 mg tablet, Take  by mouth daily. , Disp: , Rfl:  
  CALCIUM CARBONATE/VITAMIN D3 (CALCIUM 600 WITH VITAMIN D3 PO), Take  by mouth., Disp: , Rfl:  
  multivitamin (ONE A DAY) tablet, Take 1 Tab by mouth daily. , Disp: , Rfl:  
  omega-3 fatty acids-vitamin e (FISH OIL) 1,000 mg cap, Take 1 Cap by mouth., Disp: , Rfl:  
  rosuvastatin (CRESTOR) 10 mg tablet, TAKE 1 TABLET BY MOUTH AT BEDTIME (REPLACES PRAVASTATIN), Disp: , Rfl: 6 
  PSYLLIUM SEED, WITH DEXTROSE, (FIBER PO), Take  by mouth., Disp: , Rfl:  
  pravastatin (PRAVACHOL) 40 mg tablet, TAKE 1 TABLET BY MOUTH EVERY DAY, Disp: , Rfl: 3 
  ascorbic acid, vitamin C, (VITAMIN C) 500 mg tablet, Take  by mouth., Disp: , Rfl:  
  divalproex DR (DEPAKOTE) 125 mg tablet, Take 125 mg by mouth daily. , Disp: , Rfl:   METHYLSULFONYLMETHANE (MSM PO), Take 1,000 mg by mouth daily. , Disp: , Rfl:  
 
Imaging: MRI Results (most recent): No results found for this or any previous visit. CT Results (most recent): No results found for this or any previous visit. Reviewed records in [employer] [facility] tab today Lab Review No results found for this or any previous visit. Exam: 
Visit Vitals /66 Pulse 82 Resp 18 Wt 71.7 kg (158 lb) SpO2 98% BMI 30.86 kg/m² General:  Alert, cooperative, no distress. Head:  Normocephalic, without obvious abnormality, atraumatic. Respiratory: 
Heart:   Non labored breathing Regular rate and rhythm, no murmurs Neck:     
Extremities: Warm, no cyanosis or edema. Pulses: 2+ radial pulses. Neurologic:  MS: Alert and oriented x 4, speech intact. Language intact. Attention and fund of knowledge appropriate. Recent and remote memory intact. Cranial Nerves: 
II: visual fields II: pupils II: optic disc   
III,VII: ptosis none III,IV,VI: extraocular muscles  EOMI, no nystagmus or diplopia V: facial light touch sensation VII: facial muscle function   symmetric VIII: hearing intact IX: soft palate elevation XI: trapezius strength XI: sternocleidomastoid strength XII: tongue Motor: normal bulk and tone, no tremor Strength: 5/5 throughout Sensory: 
Coordination: Intact FTN Gait: normal gait, able to tandem walk Reflexes: 2+ symmetric Assessment/Plan Pt is an 80 y.o. 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 Exam with intact reflexes, no weakness, normal gait, no signs of significant progression. Possible mild or early large fiber neuropathy versus small fiber neuropathy, likely idiopathic or hereditary. Continue gabapentin 300mg/900mg. F/u in clinic in 6 months,instructed to call if needed. ICD-10-CM ICD-9-CM 1. Polyneuropathy G62.9 356.9 Signed: Haydee Koroma MD 
5/1/2019

## 2019-11-10 DIAGNOSIS — G62.9 POLYNEUROPATHY: Primary | ICD-10-CM

## 2019-11-11 RX ORDER — GABAPENTIN 300 MG/1
CAPSULE ORAL
Qty: 360 CAP | Refills: 0 | Status: SHIPPED | OUTPATIENT
Start: 2019-11-11 | End: 2020-01-08 | Stop reason: SDUPTHER

## 2019-11-14 ENCOUNTER — OFFICE VISIT (OUTPATIENT)
Dept: NEUROLOGY | Age: 84
End: 2019-11-14

## 2019-11-14 VITALS
BODY MASS INDEX: 30.47 KG/M2 | SYSTOLIC BLOOD PRESSURE: 122 MMHG | DIASTOLIC BLOOD PRESSURE: 64 MMHG | RESPIRATION RATE: 18 BRPM | HEART RATE: 79 BPM | WEIGHT: 156 LBS | OXYGEN SATURATION: 98 %

## 2019-11-14 DIAGNOSIS — G62.9 POLYNEUROPATHY: Primary | ICD-10-CM

## 2019-11-14 NOTE — LETTER
11/14/19 Patient: Crescencio [place] YOB: 1934 Date of Visit: 11/14/2019 Cherry Jamil MD 
[address] [place] [id number] VIA Facsimile: [phone] Dear Cherry Jamil MD, Thank you for referring Ms. Crescencio Villeda to [address] [place] [address] for evaluation. My notes for this consultation are attached. If you have questions, please do not hesitate to call me. I look forward to following your patient along with you. Sincerely, Aj Jalloh MD

## 2019-11-14 NOTE — PROGRESS NOTES
Neurology Progress Note    HISTORY PROVIDED BY: patient    Chief Complaint:   Chief Complaint   Patient presents with    Peripheral Neuropathy      Subjective:   Pt is an 80 y.o. 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 Exam with intact reflexes, no weakness, normal gait, no signs of significant progression. Possible mild or early large fiber neuropathy versus small fiber neuropathy, likely idiopathic or hereditary. Continued gabapentin 300mg/900mg. She returns for f/u. Reports she is doing well. Still has pain in right foot, believes it is in part due to foot surgery on that foot for Castillo's neuroma. Mainly in toes, typically in morning before she has taken any meds. Will hurt more after she has been on her feet all day. Tylenol and rest will help. Taking gabapentin 300/900mg.      Past Medical History:   Diagnosis Date    BPPV (benign paroxysmal positional vertigo)     Concussion     after fall 12/14/13    Depression     Environmental allergies     Foot pain, bilateral     Hyperlipidemia     Hypertension     Hypothyroidism     Polyneuropathy       Past Surgical History:   Procedure Laterality Date    HX APPENDECTOMY      HX CATARACT REMOVAL      HX CHOLECYSTECTOMY      HX COLECTOMY      HX TOTAL ABDOMINAL HYSTERECTOMY        Social History     Socioeconomic History    Marital status:      Spouse name: Not on file    Number of children: Not on file    Years of education: Not on file    Highest education level: Not on file   Occupational History    Occupation: Retired   Social Needs    Financial resource strain: Not on file    Food insecurity:     Worry: Not on file     Inability: Not on file   [employer] needs:     Medical: Not on file     Non-medical: Not on file   Tobacco Use    Smoking status: Never Smoker    Smokeless tobacco: Never Used   Substance and Sexual Activity    Alcohol use: Yes    Drug use: No    Sexual activity: Not on file   Lifestyle    Physical activity:     Days per week: Not on file     Minutes per session: Not on file    Stress: Not on file   Relationships    Social connections:     Talks on phone: Not on file     Gets together: Not on file     Attends Confucianism service: Not on file     Active member of club or organization: Not on file     Attends meetings of clubs or organizations: Not on file     Relationship status: Not on file    Intimate partner violence:     Fear of current or ex partner: Not on file     Emotionally abused: Not on file     Physically abused: Not on file     Forced sexual activity: Not on file   Other Topics Concern    Not on file   Social History Narrative    Not on file     Family History   Problem Relation Age of Onset    Parkinson's Disease Mother     Heart Disease Mother     Arthritis-rheumatoid Mother     Cancer Father     Suicide Daughter     No Known Problems Daughter           Objective:   ROS:  Per HPI o/w reviewed and neg    Allergies   Allergen Reactions    Sulfa (Sulfonamide Antibiotics) Rash       Meds:    Current Outpatient Medications:     gabapentin (NEURONTIN) 300 mg capsule, TAKE 1 CAPSULE BY MOUTH DAILY IN THE MORNING AND 3 CAPSULES IN THE EVENING, Disp: 360 Cap, Rfl: 0    atorvastatin (LIPITOR) 40 mg tablet, Take  by mouth daily. , Disp: , Rfl:     amLODIPine (NORVASC) 5 mg tablet, TAKE 1 TABLET BY MOUTH EVERY DAY, Disp: , Rfl: 2    citalopram (CELEXA) 20 mg tablet, TAKE ONE TABLET BY MOUTH DAILY IN THE MORNING, Disp: , Rfl: 8    finasteride (PROSCAR) 5 mg tablet, TAKE 1 TABLET BY MOUTH EVERY DAY, Disp: , Rfl: 4    valsartan-hydroCHLOROthiazide (DIOVAN-HCT) 320-25 mg per tablet, TAKE 1 TABLET BY MOUTH EVERY DAY, Disp: , Rfl: 3    levothyroxine (SYNTHROID) 125 mcg tablet, 100 mcg. TAKE 1 TABLET BY MOUTH EVERY DAY, Disp: , Rfl: 3    cetirizine (ZYRTEC) 10 mg tablet, Take  by mouth daily. , Disp: , Rfl:     CALCIUM CARBONATE/VITAMIN D3 (CALCIUM 600 WITH VITAMIN D3 PO), Take  by mouth., Disp: , Rfl:     multivitamin (ONE A DAY) tablet, Take 1 Tab by mouth daily. , Disp: , Rfl:     omega-3 fatty acids-vitamin e (FISH OIL) 1,000 mg cap, Take 1 Cap by mouth., Disp: , Rfl:     Imaging:  MRI Results (most recent):  No results found for this or any previous visit. CT Results (most recent):  No results found for this or any previous visit. Reviewed records in [email or web address] and [employer] tab today    Lab Review   No results found for this or any previous visit. Exam:  Visit Vitals  /64   Pulse 79   Resp 18   Wt 70.8 kg (156 lb)   SpO2 98%   BMI 30.47 kg/m²     General:  Alert, cooperative, no distress. Head:  Normocephalic, without obvious abnormality, atraumatic. Respiratory:  Heart:   Non labored breathing  Regular rate and rhythm, no murmurs   Neck:      Extremities: Warm, no cyanosis or edema. Pulses: 2+ radial pulses. Neurologic:  MS: Alert and oriented x 4, speech intact. Language intact. Attention and fund of knowledge appropriate. Recent and remote memory intact.   Cranial Nerves:  II: visual fields    II: pupils    II: optic disc    III,VII: ptosis none   III,IV,VI: extraocular muscles  EOMI, no nystagmus or diplopia   V: facial light touch sensation     VII: facial muscle function   symmetric   VIII: hearing intact   IX: soft palate elevation     XI: trapezius strength     XI: sternocleidomastoid strength    XII: tongue       Motor: normal bulk and tone, no tremor              Strength: 5/5 throughout  Sensory: Intact to vibratory sensation in great toes, PP dec in feet  Coordination: Intact FTN, HTS  Gait: normal gait  Reflexes: 2+ symmetric, toes down going           Assessment/Plan   Pt is an 80 y.o. 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 Left foot pain resolved, right foot pain continues, primarily in toes, and is worse on awakening or after she is on her feet all day, relieved with rest and tylenol. Additionally, had bilateral leg pain that was different from this right foot pain, previously well controlled on Lyrica 50mg bid, but unable to continue due to cost, well controlled on gabapentin 300/900mg. NCS/EMG 2/4/2016 findings could be consistent with an early or mild large fiber neuropathy. Exam with intact reflexes, no weakness, normal gait, no signs of significant progression. Possible mild or early large fiber neuropathy versus small fiber neuropathy, likely idiopathic or hereditary. Continue gabapentin 300mg/900mg. Right foot pain seems most c/w musculoskeletal pain. Discussed referral to ortho PT, but would like to hold off at this time. F/u in clinic in 6 months,instructed to call if needed. ICD-10-CM ICD-9-CM    1. Polyneuropathy G62.9 356.9        Signed:   Lisa Vazquez MD  11/14/2019

## 2020-01-08 DIAGNOSIS — G62.9 POLYNEUROPATHY: ICD-10-CM

## 2020-01-08 RX ORDER — GABAPENTIN 300 MG/1
CAPSULE ORAL
Qty: 360 CAP | Refills: 1 | Status: SHIPPED | OUTPATIENT
Start: 2020-01-08 | End: 2020-01-10 | Stop reason: SDUPTHER

## 2020-01-08 NOTE — TELEPHONE ENCOUNTER
Dr. Keyshawn Cain the Pharmacy is requesting a refill for Gabapentin. Patient LS 11/14/19 and NOV 06/03/20.    Thanks  SLA

## 2020-01-10 ENCOUNTER — TELEPHONE (OUTPATIENT)
Dept: NEUROLOGY | Age: 85
End: 2020-01-10

## 2020-01-10 DIAGNOSIS — G62.9 POLYNEUROPATHY: ICD-10-CM

## 2020-01-10 RX ORDER — GABAPENTIN 300 MG/1
CAPSULE ORAL
Qty: 360 CAP | Refills: 1 | Status: SHIPPED | OUTPATIENT
Start: 2020-01-10 | End: 2020-04-06 | Stop reason: SDUPTHER

## 2020-01-10 NOTE — TELEPHONE ENCOUNTER
----- Message from Renny Melgoza sent at 1/10/2020  9:25 AM EST -----  Regarding: /Refill  Contact: [phone]  Joanna called from [facility] mail order pharmacy following up on a refill request for the medication gabapentin . Joanna's best contact number is [phone] .

## 2020-01-10 NOTE — TELEPHONE ENCOUNTER
Leonor Beavers received a refill request for gabapentin earlier today from [facility] that was cue'd [facility] for [facility]      [facility] and [facility] and [facility] Rx [facility].   (Brynn Benito again, the [facility] pharmacy was not added to the pt's preferred pharmacy list.)

## 2020-04-06 ENCOUNTER — TELEPHONE (OUTPATIENT)
Dept: NEUROLOGY | Age: 85
End: 2020-04-06

## 2020-04-06 DIAGNOSIS — G62.9 POLYNEUROPATHY: ICD-10-CM

## 2020-04-06 RX ORDER — GABAPENTIN 300 MG/1
CAPSULE ORAL
Qty: 450 CAP | Refills: 1 | Status: SHIPPED | OUTPATIENT
Start: 2020-04-06 | End: 2020-11-16 | Stop reason: SDUPTHER

## 2020-04-06 NOTE — TELEPHONE ENCOUNTER
Called pt and discussed. Will try adding gabapentin 300mg at noon, so 300/300/900mg. She will start this dose with the pills she has at home and new Rx sent to [facility].

## 2020-04-06 NOTE — TELEPHONE ENCOUNTER
Dr. Christine Martinez the patient's c/o restless legs with severe pain that's waking her up every night then she just has to get up because of the pain. She notes that when she switched to the [address] in January  it seems like the Gabapentin is no longer as effective. Patient notes speaking with the Pharmacy and they report no change in vendor use. Patient would like you to help with the leg pain and maybe if you change dose or order something else [facility] [address] [place] [address]. [facility], [facility] [address] [place] [address].  Please Advise  Thanks  SLA

## 2020-06-03 ENCOUNTER — DOCUMENTATION ONLY (OUTPATIENT)
Dept: NEUROLOGY | Age: 85
End: 2020-06-03

## 2020-06-03 ENCOUNTER — VIRTUAL VISIT (OUTPATIENT)
Dept: NEUROLOGY | Age: 85
End: 2020-06-03

## 2020-06-03 VITALS — HEIGHT: 60 IN | BODY MASS INDEX: 29.45 KG/M2 | WEIGHT: 150 LBS

## 2020-06-03 DIAGNOSIS — G62.9 POLYNEUROPATHY: Primary | ICD-10-CM

## 2020-06-03 DIAGNOSIS — M79.672 PAIN IN BOTH FEET: ICD-10-CM

## 2020-06-03 DIAGNOSIS — M79.671 PAIN IN BOTH FEET: ICD-10-CM

## 2020-06-03 NOTE — PROGRESS NOTES
Neurology Progress Note    HISTORY PROVIDED BY: patient  Daija Dickerson is a 80 y.o. female who was seen by synchronous (real-time) audio-video technology on 6/3/2020. Two factor identification completed. Consent:  She and/or her healthcare decision maker is aware that this patient-initiated Telehealth encounter is a billable service, with coverage as determined by her insurance carrier. She is aware that she may receive a bill and has provided verbal consent to proceed: Yes    I was in the office while conducting this encounter. I discussed with the patient the nature of our telemedicine visit: Our sessions are not being recorded and personal health information is protected. We will provide follow up care in person when the patient needs it. Pursuant to the emergency declaration under the [facility], [facility] waiver authority and the [employer] Act, this Virtual  Visit was conducted, with patient's consent, to reduce the patient's risk of exposure to COVID-19 and provide continuity of care for an established patient. Chief Complaint:   Chief Complaint   Patient presents with    Neurologic Problem      Subjective:   Pt is an 80 y.o. 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 Left foot pain resolved, right foot pain continues, primarily in toes, and is worse on awakening or after she is on her feet all day, relieved with rest and tylenol. Additionally, had bilateral leg pain that was different from this right foot pain, previously well controlled on Lyrica 50mg bid, but unable to continue due to cost, well controlled on gabapentin 300/900mg. NCS/EMG 2/4/2016 findings could be consistent with an early or mild large fiber neuropathy.   Exam with intact reflexes, no weakness, normal gait, no signs of significant progression. Possible mild or early large fiber neuropathy versus small fiber neuropathy, likely idiopathic or hereditary. Continued gabapentin 300mg/900mg. Right foot pain seems most c/w musculoskeletal pain. Discussed referral to ortho PT, but wanted to hold off at the time. This is a virtual f/u visit. She is taking gabapentin 600/900mg daily, increased by phone 4/6/20. She is staying inside a great deal now, but feels like she needs more exercise. No pain in right foot with walking. She had bronchitis in Feb, seen at [address] [place] [address] and sent by ambulance to ED. She was tested for COVID-19 and was neg, had chest imaging that was neg. Treated with steroid pack and PCP started an Abx with improvement after 2 days. She has seen her Cardiologist and everything looked great. Has a smart watch that monitors steps and HR. Past Medical History:   Diagnosis Date    BPPV (benign paroxysmal positional vertigo)     Concussion     after fall 12/14/13    Depression     Environmental allergies     Foot pain, bilateral     Hyperlipidemia     Hypertension     Hypothyroidism     Polyneuropathy       Past Surgical History:   Procedure Laterality Date    HX APPENDECTOMY      HX CATARACT REMOVAL      HX CHOLECYSTECTOMY      HX COLECTOMY      HX TOTAL ABDOMINAL HYSTERECTOMY        Social History     Socioeconomic History    Marital status:      Spouse name: Not on file    Number of children: Not on file    Years of education: Not on file    Highest education level: Not on file   Occupational History    Occupation: Retired   Social Needs    Financial resource strain: Not on file    Food insecurity     Worry: Not on file     Inability: Not on file   [employer] needs     Medical: Not on file     Non-medical: Not on file   Tobacco Use    Smoking status: Never Smoker    Smokeless tobacco: Never Used   Substance and Sexual Activity    Alcohol use:  Yes    Drug use: No    Sexual activity: Not on file   Lifestyle    Physical activity     Days per week: Not on file     Minutes per session: Not on file    Stress: Not on file   Relationships    Social connections     Talks on phone: Not on file     Gets together: Not on file     Attends [employer] service: Not on file     Active member of club or organization: Not on file     Attends meetings of clubs or organizations: Not on file     Relationship status: Not on file    Intimate partner violence     Fear of current or ex partner: Not on file     Emotionally abused: Not on file     Physically abused: Not on file     Forced sexual activity: Not on file   Other Topics Concern    Not on file   Social History Narrative    Not on file     Family History   Problem Relation Age of Onset    Parkinson's Disease Mother     Heart Disease Mother     Arthritis-rheumatoid Mother     Cancer Father     Suicide Daughter     No Known Problems Daughter           Objective:   ROS:  Per HPI o/w reviewed and neg    Allergies   Allergen Reactions    Sulfa (Sulfonamide Antibiotics) Rash       Meds:    Current Outpatient Medications:     gabapentin (NEURONTIN) 300 mg capsule, TAKE 1 CAPSULE BY MOUTH DAILY IN THE MORNING, 1 at Noon, AND 3 CAPSULES IN THE EVENING, Disp: 450 Cap, Rfl: 1    atorvastatin (LIPITOR) 40 mg tablet, Take  by mouth daily. , Disp: , Rfl:     amLODIPine (NORVASC) 5 mg tablet, TAKE 1 TABLET BY MOUTH EVERY DAY, Disp: , Rfl: 2    citalopram (CELEXA) 20 mg tablet, TAKE ONE TABLET BY MOUTH DAILY IN THE MORNING, Disp: , Rfl: 8    finasteride (PROSCAR) 5 mg tablet, TAKE 1 TABLET BY MOUTH EVERY DAY, Disp: , Rfl: 4    valsartan-hydroCHLOROthiazide (DIOVAN-HCT) 320-25 mg per tablet, TAKE 1 TABLET BY MOUTH EVERY DAY, Disp: , Rfl: 3    levothyroxine (SYNTHROID) 125 mcg tablet, 100 mcg. TAKE 1 TABLET BY MOUTH EVERY DAY, Disp: , Rfl: 3    cetirizine (ZYRTEC) 10 mg tablet, Take  by mouth daily. , Disp: , Rfl:     CALCIUM CARBONATE/VITAMIN D3 (CALCIUM 600 WITH VITAMIN D3 PO), Take  by mouth., Disp: , Rfl:     multivitamin (ONE A DAY) tablet, Take 1 Tab by mouth daily. , Disp: , Rfl:     omega-3 fatty acids-vitamin e (FISH OIL) 1,000 mg cap, Take 1 Cap by mouth., Disp: , Rfl:     Imaging:  MRI Results (most recent):  No results found for this or any previous visit. CT Results (most recent):  No results found for this or any previous visit. Reviewed records in [employer] tab today    Lab Review   No results found for this or any previous visit. Limited exam due to VV  Exam:  Visit Vitals  Ht 5' (1.524 m)   Wt 68 kg (150 lb)   BMI 29.29 kg/m²     General:  Alert, cooperative, no distress. Head:  Normocephalic, without obvious abnormality, atraumatic. Respiratory:  Heart:   Non labored breathing     Neck:      Extremities:    Pulses:        Neurologic:  MS: Alert and oriented x 4, speech intact. Language intact. Attention and fund of knowledge appropriate. Recent and remote memory intact. Cranial Nerves:  II: visual fields    II: pupils    II: optic disc    III,VII: ptosis none   III,IV,VI: extraocular muscles  EOMI, no nystagmus or diplopia   V: facial light touch sensation     VII: facial muscle function   symmetric   VIII: hearing intact   IX: soft palate elevation     XI: trapezius strength     XI: sternocleidomastoid strength    XII: tongue     Exam 11/14/19:  Motor: normal bulk and tone, no tremor              Strength: 5/5 throughout  Sensory: Intact to vibratory sensation in great toes, PP dec in feet  Coordination: Intact FTN, HTS  Gait: normal gait  Reflexes: 2+ symmetric, toes down going           Assessment/Plan   Pt is an 80 y.o. 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  Left foot pain was improved on gabapentin, and right foot pain was primarily in toes, worse on awakening or after she is on her feet all day, relieved with rest and tylenol suggestive of musculoskeletal pain. Today reports improvement in pain in both feet on gabapentin 660/900mg. NCS/EMG 2/4/2016 findings could be consistent with an early or mild large fiber neuropathy. Exam with intact reflexes, no weakness, normal gait, no signs of significant progression. Possible mild or early large fiber neuropathy versus small fiber neuropathy, likely idiopathic or hereditary. Continue gabapentin 600mg/900mg. Pt encouraged to gradually increase her walking each week. F/u in clinic in 6 months,instructed to call if needed. ICD-10-CM ICD-9-CM    1. Polyneuropathy G62.9 356.9    2. Pain in both feet M79.671 729.5     M79.672         Signed:   Rex Salvador MD  6/3/2020

## 2020-06-03 NOTE — PROGRESS NOTES
Ms. Howard Magallon is following up polyneuropathy.  Her bilateral lower extremities are effected. (email [email or web address])

## 2020-11-16 DIAGNOSIS — G62.9 POLYNEUROPATHY: ICD-10-CM

## 2020-11-16 RX ORDER — GABAPENTIN 300 MG/1
CAPSULE ORAL
Qty: 450 CAP | Refills: 1 | Status: SHIPPED | OUTPATIENT
Start: 2020-11-16 | End: 2021-05-14 | Stop reason: SDUPTHER

## 2020-11-17 ENCOUNTER — TELEPHONE (OUTPATIENT)
Dept: NEUROLOGY | Age: 85
End: 2020-11-17

## 2021-05-14 ENCOUNTER — TELEPHONE (OUTPATIENT)
Dept: NEUROLOGY | Age: 86
End: 2021-05-14

## 2021-05-14 DIAGNOSIS — G62.9 POLYNEUROPATHY: ICD-10-CM

## 2021-05-14 RX ORDER — GABAPENTIN 300 MG/1
CAPSULE ORAL
Qty: 450 CAP | Refills: 1 | OUTPATIENT
Start: 2021-05-14

## 2021-05-14 NOTE — TELEPHONE ENCOUNTER
Spoke w/ pt. Needs a refill on Gabapentin She stated she has a 1 week left of meds. Refill request sent.

## 2021-05-17 RX ORDER — GABAPENTIN 300 MG/1
CAPSULE ORAL
Qty: 450 CAP | Refills: 0 | Status: SHIPPED | OUTPATIENT
Start: 2021-05-17 | End: 2021-07-22 | Stop reason: SDUPTHER

## 2021-05-17 NOTE — TELEPHONE ENCOUNTER
Eduarda Ortiz - I have not seen her since June, 2020. She no showed January appt. I refused her refill of gabapentin on 5/14/21 b/c she needs an appt. Please call and make a f/u appt for pt. I have sent in one refill. If she does not wish to f/u or cannot f/u with me, she may want to ask her PCP to continue to refill this medication.

## 2021-05-18 NOTE — TELEPHONE ENCOUNTER
Spoke w/ pt. Inform her that Dr. Noel Ch has sent in one refill of Gabapentin to pharmacy.  Pt is scheduled for a f/u appt w/ Dr. Noel Ch for 7/22/21 at [address] [place] [address].

## 2021-07-22 ENCOUNTER — OFFICE VISIT (OUTPATIENT)
Dept: NEUROLOGY | Age: 86
End: 2021-07-22
Payer: MEDICARE

## 2021-07-22 VITALS
DIASTOLIC BLOOD PRESSURE: 60 MMHG | OXYGEN SATURATION: 95 % | SYSTOLIC BLOOD PRESSURE: 110 MMHG | BODY MASS INDEX: 31.97 KG/M2 | HEART RATE: 71 BPM | WEIGHT: 158.6 LBS | HEIGHT: 59 IN

## 2021-07-22 DIAGNOSIS — G62.9 POLYNEUROPATHY: Primary | ICD-10-CM

## 2021-07-22 PROCEDURE — G8510 SCR DEP NEG, NO PLAN REQD: HCPCS | Performed by: PSYCHIATRY & NEUROLOGY

## 2021-07-22 PROCEDURE — G8536 NO DOC ELDER MAL SCRN: HCPCS | Performed by: PSYCHIATRY & NEUROLOGY

## 2021-07-22 PROCEDURE — 1090F PRES/ABSN URINE INCON ASSESS: CPT | Performed by: PSYCHIATRY & NEUROLOGY

## 2021-07-22 PROCEDURE — 99213 OFFICE O/P EST LOW 20 MIN: CPT | Performed by: PSYCHIATRY & NEUROLOGY

## 2021-07-22 PROCEDURE — G8427 DOCREV CUR MEDS BY ELIG CLIN: HCPCS | Performed by: PSYCHIATRY & NEUROLOGY

## 2021-07-22 PROCEDURE — G8417 CALC BMI ABV UP PARAM F/U: HCPCS | Performed by: PSYCHIATRY & NEUROLOGY

## 2021-07-22 PROCEDURE — 1101F PT FALLS ASSESS-DOCD LE1/YR: CPT | Performed by: PSYCHIATRY & NEUROLOGY

## 2021-07-22 RX ORDER — GLUCOSAMINE/CHONDR SU A SOD 750-600 MG
TABLET ORAL
COMMUNITY

## 2021-07-22 RX ORDER — LANOLIN ALCOHOL/MO/W.PET/CERES
1000 CREAM (GRAM) TOPICAL DAILY
COMMUNITY

## 2021-07-22 RX ORDER — GABAPENTIN 300 MG/1
CAPSULE ORAL
Qty: 450 CAPSULE | Refills: 1 | Status: SHIPPED | OUTPATIENT
Start: 2021-07-22 | End: 2022-03-08 | Stop reason: SDUPTHER

## 2021-07-22 NOTE — PROGRESS NOTES
Neurology Progress Note    HISTORY PROVIDED BY: patient    Chief Complaint:   Chief Complaint   Patient presents with    Follow-up    Peripheral Neuropathy     in feet      Subjective:   Pt is an 80 y.o. 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 Left foot pain was improved on gabapentin, and right foot pain was primarily in toes, worse on awakening or after she is on her feet all day, relieved with rest and tylenol suggestive of musculoskeletal pain. At last visit, had improvement in pain in both feet on gabapentin 660/900mg. NCS/EMG 2/4/2016 findings could be consistent with an early or mild large fiber neuropathy. Exam with intact reflexes, no weakness, normal gait, no signs of significant progression. Possible mild or early large fiber neuropathy versus small fiber neuropathy, likely idiopathic or hereditary. Continued gabapentin 600mg/900mg. Pt encouraged to gradually increase her walking each week. She returns for very delayed f/u. She is still taking gabapentin 600/900mg with good control of foot pain, occasionally will forget PM dose and will noticed an itching/restless type sensation in her legs. She has not been walking much due to hot weather, she is very active in her home. She denies any new neuro sxs or new medical issues other than hair loss. She is making an appt with a dermatologist to discuss and looking at wigs.     Past Medical History:   Diagnosis Date    BPPV (benign paroxysmal positional vertigo)     Concussion     after fall 12/14/13    Depression     Environmental allergies     Foot pain, bilateral     Hyperlipidemia     Hypertension     Hypothyroidism     Polyneuropathy       Past Surgical History:   Procedure Laterality Date    HX APPENDECTOMY      HX CATARACT REMOVAL      HX CHOLECYSTECTOMY      HX TOTAL ABDOMINAL HYSTERECTOMY      HX TOTAL COLECTOMY        Social History     Socioeconomic History    Marital status:      Spouse name: Not on file    Number of children: Not on file    Years of education: Not on file    Highest education level: Not on file   Occupational History    Occupation: Retired   Tobacco Use    Smoking status: Never Smoker    Smokeless tobacco: Never Used   Substance and Sexual Activity    Alcohol use: Yes    Drug use: No    Sexual activity: Not on file   Other Topics Concern    Not on file   Social History Narrative    Not on file     Social Determinants of Health     Financial Resource Strain:     Difficulty of Paying Living Expenses:    Food Insecurity:     Worried About Running Out of Food in the Last Year:     [address] in the Last Year:    Transportation Needs:     Lack of Transportation (Medical):  Lack of Transportation (Non-Medical):    Physical Activity:     Days of Exercise per Week:     Minutes of Exercise per Session:    Stress:     Feeling of Stress :    Social Connections:     Frequency of Communication with Friends and Family:     Frequency of Social Gatherings with Friends and Family:     Attends Alevism Services:     Active Member of Clubs or Organizations:     Attends Club or Organization Meetings:     Marital Status:    Intimate Partner Violence:     Fear of Current or Ex-Partner:     Emotionally Abused:     Physically Abused:     Sexually Abused:      Family History   Problem Relation Age of Onset    Parkinson's Disease Mother     Heart Disease Mother    [facility] Arthritis-rheumatoid Mother     Cancer Father     Suicide Daughter     No Known Problems Daughter           Objective:   ROS:  Per HPI o/w reviewed and neg    Allergies   Allergen Reactions    Sulfa (Sulfonamide Antibiotics) Rash       Meds:    Current Outpatient Medications:     cyanocobalamin 1,000 mcg tablet, Take 1,000 mcg by mouth daily.  Vitamin B 12, Disp: , Rfl:     Biotin 2,500 mcg cap, Take  by mouth., Disp: , Rfl:     gabapentin (NEURONTIN) 300 mg capsule, TAKE 1 CAPSULE BY MOUTH DAILY IN THE MORNING, 1 at Noon, AND 3 CAPSULES IN THE EVENING, Disp: 450 Cap, Rfl: 0    atorvastatin (LIPITOR) 40 mg tablet, Take 20 mg by mouth daily. , Disp: , Rfl:     amLODIPine (NORVASC) 5 mg tablet, TAKE 1 TABLET BY MOUTH EVERY DAY, Disp: , Rfl: 2    citalopram (CELEXA) 20 mg tablet, TAKE ONE TABLET BY MOUTH DAILY IN THE MORNING, Disp: , Rfl: 8    finasteride (PROSCAR) 5 mg tablet, TAKE 1 TABLET BY MOUTH EVERY DAY, Disp: , Rfl: 4    valsartan-hydroCHLOROthiazide (DIOVAN-HCT) 320-25 mg per tablet, TAKE 1 TABLET BY MOUTH EVERY DAY, Disp: , Rfl: 3    levothyroxine (SYNTHROID) 125 mcg tablet, 150 mcg. TAKE 1 TABLET BY MOUTH EVERY DAY, Disp: , Rfl: 3    cetirizine (ZYRTEC) 10 mg tablet, Take  by mouth daily. , Disp: , Rfl:     CALCIUM CARBONATE/VITAMIN D3 (CALCIUM 600 WITH VITAMIN D3 PO), Take  by mouth., Disp: , Rfl:     multivitamin (ONE A DAY) tablet, Take 1 Tab by mouth daily. , Disp: , Rfl:     omega-3 fatty acids-vitamin e (FISH OIL) 1,000 mg cap, Take 1 Cap by mouth., Disp: , Rfl:     Imaging:  MRI Results (most recent):  No results found for this or any previous visit. CT Results (most recent):  No results found for this or any previous visit. Reviewed records in [facility] and [facility] tab today    Lab Review   No results found for this or any previous visit. Exam:  Visit Vitals  /60   Pulse 71   Ht 4' 11\" (1.499 m)   Wt 158 lb 9.6 oz (71.9 kg)   SpO2 95%   BMI 32.03 kg/m²     General:  Alert, cooperative, no distress. Head:  Normocephalic, without obvious abnormality, atraumatic. Respiratory:  Heart:   Non labored breathing  RRR, no murmurs   Neck:      Extremities: Warm, no edema   Pulses: 2+ radial pulses       Neurologic:  MS: Alert and oriented x 4, speech intact. Language intact. Attention and fund of knowledge appropriate. Recent and remote memory intact.   Cranial Nerves:  II: visual fields    II: pupils    II: optic disc    III,VII: ptosis none   III,IV,VI: extraocular muscles  EOMI, no nystagmus or diplopia   V: facial light touch sensation     VII: facial muscle function   symmetric   VIII: hearing intact   IX: soft palate elevation     XI: trapezius strength     XI: sternocleidomastoid strength    XII: tongue       Motor: normal bulk and tone, no tremor              Strength: 5/5 throughout  Sensory: PP dec in right foot only  Coordination: Intact FTN  Gait: normal gait, able to tandem walk  Reflexes: 2+ symmetric           Assessment/Plan   Pt is an 80 y.o. 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 Left foot pain was improved on gabapentin, and right foot pain was primarily in toes, worse on awakening or after she is on her feet all day, relieved with rest and tylenol suggestive of musculoskeletal pain. Pain in both feet seems well controlled on gabapentin 660/900mg over the last 1.5 years. NCS/EMG 2/4/2016 findings could be consistent with an early or mild large fiber neuropathy. Exam with intact reflexes, no weakness, normal gait, no signs of significant progression. Possible mild or early large fiber neuropathy versus small fiber neuropathy, likely idiopathic or hereditary. Continue gabapentin 600mg/900mg. Discussed hair loss, it could be due to gabapentin, but not a common side effects. Suspect age related, female pattern hair loss. She will let me know if the dermatologist feels strongly it may be medication related. F/u in clinic in 1 year, instructed to call if needed in the interim. ICD-10-CM ICD-9-CM    1. Polyneuropathy  G62.9 356.9 gabapentin (NEURONTIN) 300 mg capsule       Signed:   Jayson Cheney MD  7/22/2021

## 2022-03-08 DIAGNOSIS — G62.9 POLYNEUROPATHY: ICD-10-CM

## 2022-03-08 RX ORDER — GABAPENTIN 300 MG/1
CAPSULE ORAL
Qty: 450 CAPSULE | Refills: 1 | Status: SHIPPED | OUTPATIENT
Start: 2022-03-08 | End: 2022-07-25 | Stop reason: SDUPTHER

## 2022-03-08 NOTE — TELEPHONE ENCOUNTER
Requested Prescriptions     Pending Prescriptions Disp Refills    gabapentin (NEURONTIN) 300 mg capsule 450 Capsule 1     Sig: TAKE 1 CAPSULE BY MOUTH DAILY IN THE MORNING, 1 at Noon, AND 3 CAPSULES IN THE EVENING     Pt requesting refill as soon as possible.

## 2022-03-28 ENCOUNTER — APPOINTMENT (OUTPATIENT)
Dept: GENERAL RADIOLOGY | Age: 87
End: 2022-03-28
Attending: NURSE PRACTITIONER
Payer: MEDICARE

## 2022-03-28 ENCOUNTER — APPOINTMENT (OUTPATIENT)
Dept: CT IMAGING | Age: 87
End: 2022-03-28
Attending: NURSE PRACTITIONER
Payer: MEDICARE

## 2022-03-28 ENCOUNTER — HOSPITAL ENCOUNTER (EMERGENCY)
Age: 87
Discharge: HOME | End: 2022-03-28
Attending: EMERGENCY MEDICINE
Payer: MEDICARE

## 2022-03-28 VITALS
OXYGEN SATURATION: 98 % | WEIGHT: 156.75 LBS | TEMPERATURE: 98 F | SYSTOLIC BLOOD PRESSURE: 168 MMHG | DIASTOLIC BLOOD PRESSURE: 72 MMHG | HEART RATE: 68 BPM | RESPIRATION RATE: 16 BRPM | BODY MASS INDEX: 31.66 KG/M2

## 2022-03-28 DIAGNOSIS — S09.8XXA BLUNT HEAD TRAUMA, INITIAL ENCOUNTER: ICD-10-CM

## 2022-03-28 DIAGNOSIS — S01.81XA FACIAL LACERATION, INITIAL ENCOUNTER: Primary | ICD-10-CM

## 2022-03-28 PROCEDURE — 75810000293 HC SIMP/SUPERF WND  RPR

## 2022-03-28 PROCEDURE — 74011250636 HC RX REV CODE- 250/636: Performed by: NURSE PRACTITIONER

## 2022-03-28 PROCEDURE — 72125 CT NECK SPINE W/O DYE: CPT

## 2022-03-28 PROCEDURE — 73130 X-RAY EXAM OF HAND: CPT

## 2022-03-28 PROCEDURE — 74011000250 HC RX REV CODE- 250: Performed by: NURSE PRACTITIONER

## 2022-03-28 PROCEDURE — 99284 EMERGENCY DEPT VISIT MOD MDM: CPT

## 2022-03-28 PROCEDURE — 70486 CT MAXILLOFACIAL W/O DYE: CPT

## 2022-03-28 PROCEDURE — 90715 TDAP VACCINE 7 YRS/> IM: CPT | Performed by: NURSE PRACTITIONER

## 2022-03-28 PROCEDURE — 70450 CT HEAD/BRAIN W/O DYE: CPT

## 2022-03-28 PROCEDURE — 90471 IMMUNIZATION ADMIN: CPT

## 2022-03-28 RX ORDER — BACITRACIN 500 UNIT/G
1 PACKET (EA) TOPICAL 3 TIMES DAILY
Status: DISCONTINUED | OUTPATIENT
Start: 2022-03-28 | End: 2022-03-28 | Stop reason: HOSPADM

## 2022-03-28 RX ORDER — LIDOCAINE HYDROCHLORIDE AND EPINEPHRINE 10; 10 MG/ML; UG/ML
1.5 INJECTION, SOLUTION INFILTRATION; PERINEURAL ONCE
Status: COMPLETED | OUTPATIENT
Start: 2022-03-28 | End: 2022-03-28

## 2022-03-28 RX ADMIN — TETANUS TOXOID, REDUCED DIPHTHERIA TOXOID AND ACELLULAR PERTUSSIS VACCINE, ADSORBED 0.5 ML: 5; 2.5; 8; 8; 2.5 SUSPENSION INTRAMUSCULAR at 17:00

## 2022-03-28 RX ADMIN — LIDOCAINE HYDROCHLORIDE,EPINEPHRINE BITARTRATE 15 MG: 10; .01 INJECTION, SOLUTION INFILTRATION; PERINEURAL at 17:00

## 2022-03-28 RX ADMIN — BACITRACIN 1 PACKET: 500 OINTMENT TOPICAL at 17:33

## 2022-03-28 NOTE — ED PROVIDER NOTES
28-year-old female with a past medical history of depression, hyperlipidemia, hypertension, hypothyroidism, polyneuropathy presents the ER today for evaluation of left forehead laceration and left fourth and fifth digit pain after mechanical ground-level fall that occurred within 1 hour prior to arrival.    Patient states that she was pushing her cart outside of [facility] when she fell forward and hit her head onto concrete. The fall was observed by others in the parking lot who did not report any loss of consciousness to EMS personnel. Patient presents now with a laceration to her left forehead with controlled bleeding. She denies any headache, neck pain, visual disturbances, nausea/vomiting, or other medical concerns at this time. Past Medical History:   Diagnosis Date    BPPV (benign paroxysmal positional vertigo)     Concussion     after fall 12/14/13    Depression     Environmental allergies     Foot pain, bilateral     Hyperlipidemia     Hypertension     Hypothyroidism     Polyneuropathy        Past Surgical History:   Procedure Laterality Date    HX APPENDECTOMY      HX CATARACT REMOVAL      HX CHOLECYSTECTOMY      HX TOTAL ABDOMINAL HYSTERECTOMY      HX TOTAL COLECTOMY           Family History:   Problem Relation Age of Onset    Parkinson's Disease Mother     Heart Disease Mother    [facility] Arthritis-rheumatoid Mother     Cancer Father     Suicide Daughter     No Known Problems Daughter        Social History     Socioeconomic History    Marital status:      Spouse name: Not on file    Number of children: Not on file    Years of education: Not on file    Highest education level: Not on file   Occupational History    Occupation: Retired   Tobacco Use    Smoking status: Never Smoker    Smokeless tobacco: Never Used   Substance and Sexual Activity    Alcohol use:  Yes    Drug use: No    Sexual activity: Not on file   Other Topics Concern    Not on file   Social History Narrative    Not on file     Social Determinants of Health     Financial Resource Strain:     Difficulty of Paying Living Expenses: Not on file   Food Insecurity:     Worried About Running Out of Food in the Last Year: Not on file    Dannielle of Food in the Last Year: Not on file   Transportation Needs:     Lack of Transportation (Medical): Not on file    Lack of Transportation (Non-Medical): Not on file   Physical Activity:     Days of Exercise per Week: Not on file    Minutes of Exercise per Session: Not on file   Stress:     Feeling of Stress : Not on file   Social Connections:     Frequency of Communication with Friends and Family: Not on file    Frequency of Social Gatherings with Friends and Family: Not on file    Attends Restorationism Services: Not on file    Active Member of [address] [place] [address] [employer] or Organizations: Not on file    Attends Club or Organization Meetings: Not on file    Marital Status: Not on file   Intimate Partner Violence:     Fear of Current or Ex-Partner: Not on file    Emotionally Abused: Not on file    Physically Abused: Not on file    Sexually Abused: Not on file   Housing Stability:     Unable to Pay for Housing in the Last Year: Not on file    Number of [place] in the Last Year: Not on file    Unstable Housing in the Last Year: Not on file         ALLERGIES: Sulfa (sulfonamide antibiotics)    Review of Systems   Constitutional: Negative for fever. HENT: Negative for sore throat. Eyes: Negative for visual disturbance. Respiratory: Negative for shortness of breath. Cardiovascular: Negative for palpitations. Gastrointestinal: Negative for vomiting. Genitourinary: Negative for dysuria. Musculoskeletal: Negative for myalgias. Skin: Positive for wound. Neurological: Negative for dizziness. Psychiatric/Behavioral: Negative for dysphoric mood.        Vitals:    03/28/22 1556   BP: (!) 151/62   Pulse: 68   Resp: 18   Temp: 98 °F (36.7 °C)   SpO2: 92%   Weight: 71.1 kg (156 lb 12 oz)            Physical Exam  Vitals and nursing note reviewed. Constitutional:       General: She is not in acute distress. Appearance: Normal appearance. She is not ill-appearing. HENT:      Head: Normocephalic. Comments: There is a linear laceration present (about 2.5 cm) above the left eyebrow. Bleeding controlled. No visualized foreign bodies. Nose: Nose normal.   Eyes:      General: No scleral icterus. Extraocular Movements: Extraocular movements intact. Cardiovascular:      Rate and Rhythm: Normal rate and regular rhythm. Pulmonary:      Effort: Pulmonary effort is normal.   Abdominal:      General: There is no distension. Tenderness: There is no guarding. Musculoskeletal:         General: Normal range of motion. Cervical back: Normal range of motion and neck supple. Skin:     General: Skin is warm and dry. Findings: Laceration present. Neurological:      Mental Status: She is alert and oriented to person, place, and time. Mental status is at baseline. Psychiatric:         Mood and Affect: Mood normal.         Behavior: Behavior normal.         Thought Content: Thought content normal.         Judgment: Judgment normal.          MDM      VITAL SIGNS:  Patient Vitals for the past 4 hrs:   Temp Pulse Resp BP SpO2   03/28/22 1556 98 °F (36.7 °C) 68 18 (!) 151/62 92 %         LABS:  No results found for this or any previous visit (from the past 6 hour(s)). IMAGING:  CT HEAD WO CONT   Final Result   1. No evidence of acute intracranial abnormality by this modality. 2. Periventricular white matter hypoattenuation which is a nonspecific finding   that is often associated with chronic microvascular ischemic change. CT SPINE CERV WO CONT   Final Result   Multilevel spondylosis without acute abnormality. CT MAXILLOFACIAL WO CONT   Final Result   1. No displaced facial bone fractures detected.    2. Presence of a soft tissue laceration superolateral to the left orbit as   described above. 3. Evidence of minimal left-sided ethmoid sinus disease. 4. Nasal septal deviation as described above. XR HAND LT MIN 3 V   Final Result   No acute abnormality. Medications During Visit:  Medications   bacitracin 500 unit/gram packet 1 Packet (1 Packet Topical Given 3/28/22 2017)   lidocaine-EPINEPHrine (XYLOCAINE) 1 %-1:100,000 injection 15 mg (15 mg IntraDERMal Given by Provider 3/28/22 1700)   diph,Pertuss(AC),Tet Vac-PF (BOOSTRIX) suspension 0.5 mL (0.5 mL IntraMUSCular Given 3/28/22 1700)         DECISION MAKING:  Joelle Waters is a 80 y.o. female who comes in as above. Unremarkable CT of head, C-spine, and face. Facial laceration closed with 5 non-absorbable sutures. Plan:  Topical bacitracin / neosporin + ice and return in 5 days for suture removal.    The clinical decision making for this encounter included ordering and interpreting the above diagnostic tests with comparison to prior studies that are within our EMR. Past medical and surgical histories were reviewed, as were records from recent outpatient and emergency department visits. The above results discussed and reviewed with the patient. Patient verbalized understanding of the care plan, including any changes to current outpatient medication regimen, discussed disease process, symptom control, and follow-up care. Return precautions reviewed. IMPRESSION:  1. Facial laceration, initial encounter    2.  Blunt head trauma, initial encounter        DISPOSITION:  Discharged      Current Discharge Medication List           Follow-up Information     Follow up With Specialties Details Why Michelle An MD Family Medicine In 5 days For suture removal [address]  [address] [place] [id number]      [address] [facility] In 5 days For suture removal [address] The patient is asked to follow-up with their primary care provider in the next several days. They are to call tomorrow for an appointment. The patient is asked to return promptly for any increased concerns or worsening of symptoms. They can return to this emergency department or any other emergency department. Wound Repair    Date/Time: 3/28/2022 5:41 PM  Pre-procedure re-eval: Immediately prior to the procedure, the patient was reevaluated and found suitable for the planned procedure and any planned medications. Location details: face  Wound length:2.6 - 7.5 cm  Anesthesia: local infiltration    Anesthesia:  Local Anesthetic: lidocaine 1% with epinephrine  Anesthetic total: 5 mL  Foreign bodies: no foreign bodies  Irrigation solution: saline  Irrigation method: syringe  Debridement: minimal  Skin closure: 5-0 nylon  Number of sutures: 5  Technique: simple  Approximation: close  Dressing: antibiotic ointment  Patient tolerance: patient tolerated the procedure well with no immediate complications  My total time at bedside, performing this procedure was 1-15 minutes.

## 2022-03-28 NOTE — ED TRIAGE NOTES
Triage: pt was unloading groceries at her car and cart started to roll away; pt turned too quickly and lost balanced causing her to fall. Pt c/o 5th digit of left hand, left elbow and laceration to forehead. Denies LOC, neck or back pain, chest pain, abd pain and SOB.

## 2022-03-28 NOTE — DISCHARGE INSTRUCTIONS
Return immediately for any evidence of infection:  Increasing redness, swelling, pain, drainage or fevers. Protect wound from direct sunlight for period of at least 6 months to minimize scarring. After your stitches are removed, try using mederma skin cream to minimize scarring.  While your stitches are in place, apply neosporin cream to the wound 3 times daily

## 2022-04-02 ENCOUNTER — HOSPITAL ENCOUNTER (EMERGENCY)
Age: 87
Discharge: HOME | End: 2022-04-02
Attending: EMERGENCY MEDICINE
Payer: MEDICARE

## 2022-04-02 VITALS
OXYGEN SATURATION: 95 % | HEART RATE: 59 BPM | DIASTOLIC BLOOD PRESSURE: 55 MMHG | SYSTOLIC BLOOD PRESSURE: 101 MMHG | RESPIRATION RATE: 16 BRPM | TEMPERATURE: 97.7 F

## 2022-04-02 DIAGNOSIS — Z48.02 ENCOUNTER FOR REMOVAL OF SUTURES: Primary | ICD-10-CM

## 2022-04-02 PROCEDURE — 75810000275 HC EMERGENCY DEPT VISIT NO LEVEL OF CARE

## 2022-04-02 NOTE — ED NOTES
Patient given discharge papers and instructions by triage RN. Patient verbalized understanding and stated not having questions or concerns regarding her care. Patient ambulatory out of ED in no new acute distress.

## 2022-04-02 NOTE — ED TRIAGE NOTES
Patient arrives ambulatory to the ED to get sutures removed from her L forehead.  Sutures were placed on Monday

## 2022-04-02 NOTE — ED PROVIDER NOTES
Date of Service:  4/2/2022    Patient:  Trinity Fonseca    Chief Complaint:  Suture Removal       HPI:  Trinity Fonseca is a 80 y.o.  female who presents for suture removal.  Patient states she had a fall that occurred on 3/28/2022. Patient states she had a fall the ground resulting in a laceration to the left side of forehead. Was seen at [facility] emergency department on 3/28/2022 he received 5 sutures to the left side of forehead. Patient denies uncontrollable bleeding, discharge or erythema around wound. Denies headache. Past Medical History:   Diagnosis Date    BPPV (benign paroxysmal positional vertigo)     Concussion     after fall 12/14/13    Depression     Environmental allergies     Foot pain, bilateral     Hyperlipidemia     Hypertension     Hypothyroidism     Polyneuropathy        Past Surgical History:   Procedure Laterality Date    HX APPENDECTOMY      HX CATARACT REMOVAL      HX CHOLECYSTECTOMY      HX TOTAL ABDOMINAL HYSTERECTOMY      HX TOTAL COLECTOMY           Family History:   Problem Relation Age of Onset    Parkinson's Disease Mother     Heart Disease Mother    [facility] Arthritis-rheumatoid Mother     Cancer Father     Suicide Daughter     No Known Problems Daughter        Social History     Socioeconomic History    Marital status:      Spouse name: Not on file    Number of children: Not on file    Years of education: Not on file    Highest education level: Not on file   Occupational History    Occupation: Retired   Tobacco Use    Smoking status: Never Smoker    Smokeless tobacco: Never Used   Substance and Sexual Activity    Alcohol use:  Yes    Drug use: No    Sexual activity: Not on file   Other Topics Concern    Not on file   Social History Narrative    Not on file     Social Determinants of Health     Financial Resource Strain:     Difficulty of Paying Living Expenses: Not on file   Food Insecurity:     Worried About [facility] in the Last Year: Not on file    Ran Out of Food in the Last Year: Not on file   Transportation Needs:     Lack of Transportation (Medical): Not on file    Lack of Transportation (Non-Medical): Not on file   Physical Activity:     Days of Exercise per Week: Not on file    Minutes of Exercise per Session: Not on file   Stress:     Feeling of Stress : Not on file   Social Connections:     Frequency of Communication with Friends and Family: Not on file    Frequency of Social Gatherings with Friends and Family: Not on file    Attends Scientologist Services: Not on file    Active Member of [address] [place] [address] [facility] or Organizations: Not on file    Attends Club or Organization Meetings: Not on file    Marital Status: Not on file   Intimate Partner Violence:     Fear of Current or Ex-Partner: Not on file    Emotionally Abused: Not on file    Physically Abused: Not on file    Sexually Abused: Not on file   Housing Stability:     Unable to Pay for Housing in the Last Year: Not on file    Number of [place] in the Last Year: Not on file    Unstable Housing in the Last Year: Not on file         ALLERGIES: Sulfa (sulfonamide antibiotics)    Review of Systems   Constitutional: Negative for chills and fever. HENT: Negative for facial swelling. Respiratory: Negative for shortness of breath. Cardiovascular: Negative for chest pain. Gastrointestinal: Negative for abdominal pain. Genitourinary: Negative for dysuria. Skin: Negative for rash. Allergic/Immunologic: Negative for immunocompromised state. Neurological: Negative for headaches. Psychiatric/Behavioral: Negative for agitation. All other systems reviewed and are negative. Vitals:    04/02/22 1326   BP: (!) 101/55   Pulse: (!) 59   Resp: 16   Temp: 97.7 °F (36.5 °C)   SpO2: 95%            Physical Exam  Vitals and nursing note reviewed. Constitutional:       General: She is not in acute distress.   HENT:      Head:      Comments: Linear laceration with 5 sutures placed to left side of forehead. Appears to be healing. No active bleeding. No erythema or discharge. Cardiovascular:      Rate and Rhythm: Normal rate and regular rhythm. Heart sounds: No murmur heard. Pulmonary:      Effort: Pulmonary effort is normal.   Abdominal:      Palpations: Abdomen is soft. Musculoskeletal:         General: Normal range of motion. Skin:     General: Skin is warm. Neurological:      Mental Status: She is alert and oriented to person, place, and time. Mental status is at baseline. MDM  Number of Diagnoses or Management Options         Suture/Staple Removal    Date/Time: 4/2/2022 3:19 PM  Performed by: TRISH Juarez  Authorized by: TRISH Juarez     Consent:     Consent obtained:  Verbal    Consent given by:  Patient    Risks discussed:  Bleeding, pain and wound separation    Alternatives discussed:  No treatment and delayed treatment  Location:     Location: left side of forehead. Procedure details:     Wound appearance:  No signs of infection (wound healing)    Number of sutures removed:  5  Post-procedure details:     Post-removal:  Band-Aid applied    Patient tolerance of procedure: Tolerated well, no immediate complications        VITAL SIGNS:  Patient Vitals for the past 4 hrs:   Temp Pulse Resp BP SpO2   04/02/22 1326 97.7 °F (36.5 °C) (!) 59 16 (!) 101/55 95 %         LABS:  No results found for this or any previous visit (from the past 6 hour(s)). IMAGING:  No orders to display         Medications During Visit:  Medications - No data to display      DECISION MAKING:  Vikki Nunn is a 80 y.o. female who comes in as above. Patient presents today for suture removal of the left side of her forehead. Patient states injury occurred on 28 2022. . Sutures were placed. Linear laceration on the left side of the forehead appears to be healing. Bleeding noted. No signs of infection. 5 sutures were removed from patient's left forehead. Tolerated procedure well. Patient stable upon discharge. Return precautions given to patient. IMPRESSION:  1.  Encounter for removal of sutures        DISPOSITION:  Discharged      Discharge Medication List as of 4/2/2022  1:50 PM           Follow-up Information     Follow up With Specialties Details Why Contact Info    [address] [facility]  If symptoms worsen [place] [address] [facility] [address] [facility] [facility] [phone]

## 2022-04-20 DIAGNOSIS — G62.9 POLYNEUROPATHY: ICD-10-CM

## 2022-04-21 RX ORDER — GABAPENTIN 300 MG/1
CAPSULE ORAL
Qty: 450 CAPSULE | OUTPATIENT
Start: 2022-04-21

## 2022-06-10 DIAGNOSIS — G62.9 POLYNEUROPATHY: ICD-10-CM

## 2022-06-10 RX ORDER — GABAPENTIN 300 MG/1
CAPSULE ORAL
Qty: 450 CAPSULE | Refills: 1 | Status: CANCELLED | OUTPATIENT
Start: 2022-06-10

## 2022-06-10 NOTE — TELEPHONE ENCOUNTER
Requested Prescriptions     Pending Prescriptions Disp Refills    gabapentin (NEURONTIN) 300 mg capsule 450 Capsule 1     Sig: TAKE 1 CAPSULE BY MOUTH DAILY IN THE MORNING, 1 at Noon, AND 3 CAPSULES IN THE EVENING

## 2022-06-13 ENCOUNTER — TELEPHONE (OUTPATIENT)
Dept: NEUROLOGY | Age: 87
End: 2022-06-13

## 2022-06-13 NOTE — TELEPHONE ENCOUNTER
Patient is calling needing a refill on her medication GABAPENTIN 300 mg. Patient stated she is completely out.     Please contact

## 2022-06-14 NOTE — TELEPHONE ENCOUNTER
Sent a refill request for this as well. Pt's gabapentin was refilled in March for one year. See refill request back to LPN to troubleshoot.

## 2022-06-14 NOTE — TELEPHONE ENCOUNTER
Patient Id confirmed. Patient stated she is out of medication. [facility], spoke with Shiv Gallego who stated patient was eligible for refill on 5/15/22, to fill request for controlled substance.

## 2022-06-14 NOTE — TELEPHONE ENCOUNTER
Elva Vaughn why the patient states she is out. If you look under meds tab and click on gabapentin, I refilled this medication on 3/8/22 for 6 months to [facility], so she should have enough refills to last until September. Please troubleshoot.

## 2022-06-20 DIAGNOSIS — G62.9 POLYNEUROPATHY: ICD-10-CM

## 2022-06-20 RX ORDER — GABAPENTIN 300 MG/1
CAPSULE ORAL
Qty: 10 CAPSULE | Refills: 0 | Status: CANCELLED | OUTPATIENT
Start: 2022-06-20

## 2022-06-20 RX ORDER — GABAPENTIN 300 MG/1
CAPSULE ORAL
Qty: 35 CAPSULE | Refills: 0 | Status: SHIPPED | OUTPATIENT
Start: 2022-06-20 | End: 2022-07-25 | Stop reason: ALTCHOICE

## 2022-06-20 NOTE — TELEPHONE ENCOUNTER
Refill request sent to Dr Jazmine Morales. Gaby Zayas 19, advised gabapentin shipped on 6/15/22 regular mail.

## 2022-06-20 NOTE — TELEPHONE ENCOUNTER
lov 7/22/21  Last med refill 3/8/22 90 day supply, 1 refill; shipped on 6/15/22 regular mail  Patient request 2 day supply

## 2022-06-20 NOTE — TELEPHONE ENCOUNTER
Pt called to get a 2 day supply of gabapentin sent to [facility]. Pt hasn't received medication by mail yet.

## 2022-07-25 ENCOUNTER — OFFICE VISIT (OUTPATIENT)
Dept: NEUROLOGY | Age: 87
End: 2022-07-25
Payer: MEDICARE

## 2022-07-25 ENCOUNTER — DOCUMENTATION ONLY (OUTPATIENT)
Dept: NEUROLOGY | Age: 87
End: 2022-07-25

## 2022-07-25 VITALS
DIASTOLIC BLOOD PRESSURE: 78 MMHG | HEART RATE: 69 BPM | SYSTOLIC BLOOD PRESSURE: 124 MMHG | BODY MASS INDEX: 29.69 KG/M2 | WEIGHT: 147 LBS | RESPIRATION RATE: 18 BRPM | OXYGEN SATURATION: 95 %

## 2022-07-25 DIAGNOSIS — G62.9 POLYNEUROPATHY: Primary | ICD-10-CM

## 2022-07-25 DIAGNOSIS — R26.89 IMBALANCE: ICD-10-CM

## 2022-07-25 PROCEDURE — G8427 DOCREV CUR MEDS BY ELIG CLIN: HCPCS | Performed by: PSYCHIATRY & NEUROLOGY

## 2022-07-25 PROCEDURE — 99213 OFFICE O/P EST LOW 20 MIN: CPT | Performed by: PSYCHIATRY & NEUROLOGY

## 2022-07-25 PROCEDURE — G8417 CALC BMI ABV UP PARAM F/U: HCPCS | Performed by: PSYCHIATRY & NEUROLOGY

## 2022-07-25 PROCEDURE — G8536 NO DOC ELDER MAL SCRN: HCPCS | Performed by: PSYCHIATRY & NEUROLOGY

## 2022-07-25 PROCEDURE — 1123F ACP DISCUSS/DSCN MKR DOCD: CPT | Performed by: PSYCHIATRY & NEUROLOGY

## 2022-07-25 PROCEDURE — 1101F PT FALLS ASSESS-DOCD LE1/YR: CPT | Performed by: PSYCHIATRY & NEUROLOGY

## 2022-07-25 PROCEDURE — G8510 SCR DEP NEG, NO PLAN REQD: HCPCS | Performed by: PSYCHIATRY & NEUROLOGY

## 2022-07-25 PROCEDURE — 1090F PRES/ABSN URINE INCON ASSESS: CPT | Performed by: PSYCHIATRY & NEUROLOGY

## 2022-07-25 RX ORDER — GABAPENTIN 300 MG/1
CAPSULE ORAL
Qty: 450 CAPSULE | Refills: 1 | Status: SHIPPED | OUTPATIENT
Start: 2022-07-25

## 2022-07-25 NOTE — LETTER
7/25/2022    Patient: Ca Stanley   YOB: 1934   Date of Visit: 7/25/2022     Bettie North MD  [address] [facility] [address]  Via Fax: [phone]    Dear Bettie North MD,      Thank you for referring Ms. Ca Stanley to [address] [place] [address] for evaluation. My notes for this consultation are attached. If you have questions, please do not hesitate to call me. I look forward to following your patient along with you.       Sincerely,    Neeta Cleaning MD

## 2022-07-25 NOTE — PROGRESS NOTES
Neurology Progress Note    HISTORY PROVIDED BY: patient    Chief Complaint:   Chief Complaint   Patient presents with    Follow-up     Polyneuropathy is controlled with medications. Subjective:   Pt is an 80 y.o. 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 Left foot pain was improved on gabapentin, and right foot pain was primarily in toes, worse on awakening or after she is on her feet all day, relieved with rest and tylenol suggestive of musculoskeletal pain. Pain in both feet seems well controlled on gabapentin 660/900mg over the last 1.5 years. NCS/EMG 2/4/2016 findings could be consistent with an early or mild large fiber neuropathy. Exam with intact reflexes, no weakness, normal gait, no signs of significant progression. Possible mild or early large fiber neuropathy versus small fiber neuropathy, likely idiopathic or hereditary. Continued gabapentin 600mg/900mg. Discussed hair loss, it could be due to gabapentin, but not a common side effect. Suspect age related, female pattern hair loss. She will let me know if the dermatologist feels strongly it may be medication related. She returns for fu. She went a week without her gabapentin for unclear reasons in May. She definitely had increase in pain that week. She is still taking 300/300/900mg. She had a fall in March, was at grocery store, putting groceries in trunk and saw her cart blowing away and turned quickly to stop it and fell. Hit head with an abrasion, see [facility] with stitches. CTH, CTCS, CTMF were all negative. She is having swelling in left foot for last couple of weeks. No travel. She does have varicose veins in that leg. No new health issues. Previous Testing:  NCS/EMG 2/4/2016 findings could be consistent with an early or mild large fiber neuropathy.     Past Medical History:   Diagnosis Date    BPPV (benign paroxysmal positional vertigo)     Concussion     after fall 12/14/13    Depression     Environmental allergies     Foot pain, bilateral     Hyperlipidemia     Hypertension     Hypothyroidism     Polyneuropathy       Past Surgical History:   Procedure Laterality Date    HX APPENDECTOMY      HX CATARACT REMOVAL      HX CHOLECYSTECTOMY      HX TOTAL ABDOMINAL HYSTERECTOMY      HX TOTAL COLECTOMY        Social History     Socioeconomic History    Marital status:      Spouse name: Not on file    Number of children: Not on file    Years of education: Not on file    Highest education level: Not on file   Occupational History    Occupation: Retired   Tobacco Use    Smoking status: Never    Smokeless tobacco: Never   Substance and Sexual Activity    Alcohol use: Yes    Drug use: No    Sexual activity: Not on file   Other Topics Concern    Not on file   Social History Narrative    Not on file     Social Determinants of Health     Financial Resource Strain: Not on file   Food Insecurity: Not on file   Transportation Needs: Not on file   Physical Activity: Not on file   Stress: Not on file   Social Connections: Not on file   Intimate Partner Violence: Not on file   Housing Stability: Not on file     Family History   Problem Relation Age of Onset    Parkinson's Disease Mother     Heart Disease Mother     Arthritis-rheumatoid Mother     Cancer Father     Suicide Daughter     No Known Problems Daughter           Objective:   ROS:  Per HPI o/w reviewed and neg    Allergies   Allergen Reactions    Sulfa (Sulfonamide Antibiotics) Rash       Meds:    Current Outpatient Medications:     gabapentin (NEURONTIN) 300 mg capsule, Take 1 cap by mouth in AM, 1 cap at [facility], and 3 caps in PM, Disp: 35 Capsule, Rfl: 0    gabapentin (NEURONTIN) 300 mg capsule, TAKE 1 CAPSULE BY MOUTH DAILY IN THE MORNING, 1 at Noon, AND 3 CAPSULES IN THE EVENING, Disp: 450 Capsule, Rfl: 1    cyanocobalamin 1,000 mcg tablet, Take 1,000 mcg by mouth daily. Vitamin B 12, Disp: , Rfl:     Biotin 2,500 mcg cap, Take  by mouth., Disp: , Rfl:     atorvastatin (LIPITOR) 40 mg tablet, Take 20 mg by mouth daily. , Disp: , Rfl:     amLODIPine (NORVASC) 5 mg tablet, TAKE 1 TABLET BY MOUTH EVERY DAY, Disp: , Rfl: 2    citalopram (CELEXA) 20 mg tablet, TAKE ONE TABLET BY MOUTH DAILY IN THE MORNING, Disp: , Rfl: 8    finasteride (PROSCAR) 5 mg tablet, TAKE 1 TABLET BY MOUTH EVERY DAY, Disp: , Rfl: 4    levothyroxine (SYNTHROID) 125 mcg tablet, 150 mcg. TAKE 1 TABLET BY MOUTH EVERY DAY, Disp: , Rfl: 3    cetirizine (ZYRTEC) 10 mg tablet, Take  by mouth daily. , Disp: , Rfl:     CALCIUM CARBONATE/VITAMIN D3 (CALCIUM 600 WITH VITAMIN D3 PO), Take  by mouth., Disp: , Rfl:     multivitamin (ONE A DAY) tablet, Take 1 Tab by mouth daily. , Disp: , Rfl:     Imaging:  MRI Results (most recent):  No results found for this or any previous visit. CT Results (most recent):  Results from Hospital Encounter encounter on 03/28/22    CT MAXILLOFACIAL WO CONT    Narrative  EXAM: CT MAXILLOFACIAL WO CONT    INDICATION: Left facial injury    COMPARISON: None. CONTRAST:   None. TECHNIQUE:  Multislice helical CT of the facial bones was performed in the axial  plane without intravenous contrast administration. Coronal and sagittal  reformations were generated. CT dose reduction was achieved through use of a  standardized protocol tailored for this examination and automatic exposure  control for dose modulation. FINDINGS:    Some soft tissue swelling is noted in the left periorbital region. There is some  increased density of the subcutaneous tissues in this region. This is suggestive  of edema. A small collection of air is noted within the soft tissues  superolateral to the left orbit (see axial image 54 and coronal image 12). This  is compatible with the presence of a soft tissue laceration. No displaced facial  bone fractures are seen. The globes appear to be intact.  Specifically, there is  no evidence of an orbital blowout fracture. The extraocular muscles are normal  in appearance. The optic nerves are symmetric in appearance. The intraconal fat  is well preserved bilaterally. As seen on axial image 25, a 0.5 cm soft tissue  density is noted in the posterior aspect of the left side of the ethmoid sinus. This is compatible with a mucous retention cyst/polyp. There is deviation of the  nasal septum towards the left. 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 The visualized portions of the mastoid air cells are normally aerated  bilaterally. Impression  1. No displaced facial bone fractures detected. 2. Presence of a soft tissue laceration superolateral to the left orbit as  described above. 3. Evidence of minimal left-sided ethmoid sinus disease. 4. Nasal septal deviation as described above. Reviewed records in [employer] [employer] today    Lab Review   No results found for this or any previous visit. Exam:  Visit Vitals  /78   Pulse 69   Resp 18   Wt 147 lb (66.7 kg)   SpO2 95%   BMI 29.69 kg/m²       General:  Alert, cooperative, no distress. Head:  Normocephalic, without obvious abnormality, atraumatic. Respiratory:  Heart:   Non labored breathing  RRR, no murmurs   Neck:      Extremities: Warm, focal swelling at ankle, no pitting edema, no tenderness to palpation of ankle or calf   Pulses: 2+ radial pulses       Neurologic:  MS: Alert and oriented x 4, speech intact. Language intact. Attention and fund of knowledge appropriate. Recent and remote memory intact.   Cranial Nerves:  II: visual fields    II: pupils    II: optic disc    III,VII: ptosis none   III,IV,VI: extraocular muscles  EOMI, no nystagmus or diplopia   V: facial light touch sensation     VII: facial muscle function   symmetric   VIII: hearing intact   IX: soft palate elevation     XI: trapezius strength     XI: sternocleidomastoid strength    XII: tongue       Motor: normal bulk and tone, no tremor              Strength: 5/5 throughout  Sensory:  Coordination: Intact FTN  Gait: normal gait, unable to tandem walk  Reflexes: 2+ symmetric           Assessment/Plan   Pt is an 80 y.o. 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 Left foot pain was improved on gabapentin, and right foot pain was primarily in toes, worse on awakening or after she is on her feet all day, relieved with rest and tylenol suggestive of musculoskeletal pain. Pain in both feet is well controlled on gabapentin 600/900mg. Possible mild or early large fiber neuropathy versus small fiber neuropathy, likely idiopathic or hereditary. Exam with intact reflexes, no weakness, normal gait, unable to tandem walk, no signs of significant progression. Continue gabapentin 600mg/900mg. Discussed imbalance and fall prevention, recommend PT. F/u in clinic in 1 year, instructed to call if needed in the interim. ICD-10-CM ICD-9-CM    1. Polyneuropathy  G62.9 356.9 REFERRAL TO PHYSICAL THERAPY      gabapentin (NEURONTIN) 300 mg capsule      2. Imbalance  R26.89 781.2 REFERRAL TO PHYSICAL THERAPY            Signed:   Calvin Hartman MD  7/25/2022

## 2022-09-10 NOTE — TELEPHONE ENCOUNTER
Pt calling stating her pharmacy is stating they haven't received her refill for gabapentin.  Please call back Normal

## 2023-05-26 RX ORDER — ATORVASTATIN CALCIUM 40 MG/1
20 TABLET, FILM COATED ORAL DAILY
COMMUNITY

## 2023-05-26 RX ORDER — CETIRIZINE HYDROCHLORIDE 10 MG/1
TABLET ORAL DAILY
COMMUNITY

## 2023-05-26 RX ORDER — CITALOPRAM 20 MG/1
TABLET ORAL
COMMUNITY
Start: 2016-11-05

## 2023-05-26 RX ORDER — GABAPENTIN 300 MG/1
CAPSULE ORAL
COMMUNITY
Start: 2022-07-25

## 2023-05-26 RX ORDER — LEVOTHYROXINE SODIUM 0.12 MG/1
150 TABLET ORAL
COMMUNITY
Start: 2016-10-17

## 2023-05-26 RX ORDER — AMLODIPINE BESYLATE 5 MG/1
1 TABLET ORAL DAILY
COMMUNITY
Start: 2016-10-23

## 2023-05-26 RX ORDER — FINASTERIDE 5 MG/1
1 TABLET, FILM COATED ORAL DAILY
COMMUNITY
Start: 2016-11-19

## 2025-01-09 NOTE — LETTER
7/22/2021    Patient: Bayron Suarez   YOB: 1934   Date of Visit: 7/22/2021     Jeannette Henry MD  [address] [facility] [address]  Via Fax: [phone]    Dear Jeannette Henry MD,      Thank you for referring Ms. Bayron Suarez to [address] [place] [address] for evaluation. My notes for this consultation are attached. If you have questions, please do not hesitate to call me. I look forward to following your patient along with you.       Sincerely,    Sharmaine Jorge MD
[facility]